# Patient Record
Sex: MALE | Race: BLACK OR AFRICAN AMERICAN | NOT HISPANIC OR LATINO | Employment: FULL TIME | URBAN - METROPOLITAN AREA
[De-identification: names, ages, dates, MRNs, and addresses within clinical notes are randomized per-mention and may not be internally consistent; named-entity substitution may affect disease eponyms.]

---

## 2017-05-31 ENCOUNTER — HOSPITAL ENCOUNTER (EMERGENCY)
Facility: HOSPITAL | Age: 42
Discharge: HOME | End: 2017-05-31
Attending: EMERGENCY MEDICINE

## 2017-05-31 VITALS
OXYGEN SATURATION: 98 % | WEIGHT: 230 LBS | HEIGHT: 68 IN | RESPIRATION RATE: 18 BRPM | BODY MASS INDEX: 34.86 KG/M2 | SYSTOLIC BLOOD PRESSURE: 131 MMHG | DIASTOLIC BLOOD PRESSURE: 82 MMHG | HEART RATE: 80 BPM | TEMPERATURE: 99 F

## 2017-05-31 DIAGNOSIS — G89.29 ACUTE EXACERBATION OF CHRONIC LOW BACK PAIN: Primary | ICD-10-CM

## 2017-05-31 DIAGNOSIS — M54.50 ACUTE EXACERBATION OF CHRONIC LOW BACK PAIN: Primary | ICD-10-CM

## 2017-05-31 PROCEDURE — 63600175 PHARM REV CODE 636 W HCPCS: Performed by: EMERGENCY MEDICINE

## 2017-05-31 PROCEDURE — 99283 EMERGENCY DEPT VISIT LOW MDM: CPT | Mod: 25

## 2017-05-31 PROCEDURE — 99284 EMERGENCY DEPT VISIT MOD MDM: CPT | Mod: ,,, | Performed by: EMERGENCY MEDICINE

## 2017-05-31 PROCEDURE — 96372 THER/PROPH/DIAG INJ SC/IM: CPT

## 2017-05-31 RX ORDER — NAPROXEN 500 MG/1
500 TABLET ORAL 2 TIMES DAILY PRN
Qty: 20 TABLET | Refills: 0 | Status: SHIPPED | OUTPATIENT
Start: 2017-05-31 | End: 2017-06-10

## 2017-05-31 RX ORDER — KETOROLAC TROMETHAMINE 30 MG/ML
10 INJECTION, SOLUTION INTRAMUSCULAR; INTRAVENOUS
Status: COMPLETED | OUTPATIENT
Start: 2017-05-31 | End: 2017-05-31

## 2017-05-31 RX ORDER — CYCLOBENZAPRINE HCL 10 MG
10 TABLET ORAL 3 TIMES DAILY PRN
Qty: 15 TABLET | Refills: 0 | Status: SHIPPED | OUTPATIENT
Start: 2017-05-31 | End: 2017-06-05

## 2017-05-31 RX ADMIN — KETOROLAC TROMETHAMINE 10 MG: 30 INJECTION, SOLUTION INTRAMUSCULAR at 05:05
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
## 2017-05-31 NOTE — ED NOTES
Patient identifiers verified and correct for Andrea Shields.    LOC: The patient is awake, alert and aware of environment with an appropriate affect, the patient is oriented x 3 and speaking appropriately.  APPEARANCE: Patient resting comfortably and in no acute distress, patient is clean and well groomed, patient's clothing is properly fastened.  SKIN: The skin is warm and dry, color consistent with ethnicity, patient has normal skin turgor and moist mucus membranes, skin intact, no breakdown or bruising noted.  MUSCULOSKELETAL: Patient moving all extremities spontaneously, no obvious swelling or deformities noted.

## 2017-05-31 NOTE — ED PROVIDER NOTES
Encounter Date: 5/31/2017    SCRIBE #1 NOTE: I, Radha Torres, am scribing for, and in the presence of,  Dr. Hutchinson. I have scribed the entire note.       History     Chief Complaint   Patient presents with    Abdominal Pain     cramping up on L side and in my back,      Review of patient's allergies indicates:  No Known Allergies  Time patient was seen by the provider: 5:22 PM      The patient is a 41 y.o. male with hx of: HTN, CAD and DM that presents to the ED with a complaint of lower back pain that radiates to his abdomen. Pt states this is a chronic problem that he has had intermittenlty for years. He says that when he lifts his legs the pain becomes more severe. He reports that aleve and motrin do not relieve his symptoms. Pt denies bladder and fecal incontinence and numbness/weakness.            The history is provided by the patient and medical records.     History reviewed. No pertinent past medical history.  Past Surgical History:   Procedure Laterality Date    FRACTURE SURGERY       Family History   Problem Relation Age of Onset    Hypertension Mother     Lupus Mother     Thyroid disease Maternal Aunt      Social History   Substance Use Topics    Smoking status: Never Smoker    Smokeless tobacco: Not on file    Alcohol use Yes      Comment: occasionally     Review of Systems   Constitutional: Negative for chills and fever.   HENT: Negative for nosebleeds.    Eyes: Negative for redness.   Respiratory: Negative for shortness of breath.    Cardiovascular: Negative for chest pain.   Gastrointestinal: Positive for abdominal pain. Negative for nausea and vomiting.   Genitourinary: Negative for difficulty urinating.   Musculoskeletal: Positive for back pain.   Neurological: Negative for weakness and numbness.       Physical Exam     Initial Vitals [05/31/17 1438]   BP Pulse Resp Temp SpO2   131/82 80 18 98.8 °F (37.1 °C) 98 %     Physical Exam    Nursing note and vitals reviewed.  Constitutional: He  appears well-developed and well-nourished. He is not diaphoretic. No distress.   Musculoskeletal: He exhibits no edema.   Pt has positive leg raise in bilateral legs to 60 degrees. No TTP on spine or paraspinal muscles.   Neurological: He is alert and oriented to person, place, and time.   Pt has nml strenght and sensation in bilateral lower extremities. Has nml gait.    Skin: Skin is warm. No rash and no abscess noted. No erythema.   Pt has dry scaly skin over both feet with some hyperpigmentation and discoloration that seems old.         ED Course   Procedures  Labs Reviewed - No data to display          Medical Decision Making:   History:   Old Medical Records: I decided to obtain old medical records.  Old Records Summarized: other records.       <> Summary of Records: No past medical history. Previously seen in ED for migraine and n/v.  Initial Assessment:   Pt with chronic back pain with acute exacerbation in last 3 days. He is neurologically  intact with no signs of symptoms of cauda equina. Will treat with NSAID's and muscle relaxant and outpatient PCP follow-up. Skin and feet appear to be dry and scaly and not necessarily from fungal infection advised to follow-up with PCP and return instructions given.            Scribe Attestation:   Scribe #1: I performed the above scribed service and the documentation accurately describes the services I performed. I attest to the accuracy of the note.    Attending Attestation:           Physician Attestation for Scribe:  Physician Attestation Statement for Scribe #1: I, Dr. Hutchinson, reviewed documentation, as scribed by Radha Torres in my presence, and it is both accurate and complete.                 ED Course     Clinical Impression:   The encounter diagnosis was Acute exacerbation of chronic low back pain.    Disposition:   Disposition: Discharged  Condition: Stable       Shalonda Hutchinson MD  06/10/17 1945

## 2018-05-06 ENCOUNTER — OFFICE VISIT (OUTPATIENT)
Dept: URGENT CARE | Facility: CLINIC | Age: 43
End: 2018-05-06
Payer: MEDICAID

## 2018-05-06 VITALS
HEIGHT: 68 IN | TEMPERATURE: 97 F | BODY MASS INDEX: 34.86 KG/M2 | OXYGEN SATURATION: 98 % | HEART RATE: 83 BPM | WEIGHT: 230 LBS

## 2018-05-06 DIAGNOSIS — M79.672 FOOT PAIN, LEFT: ICD-10-CM

## 2018-05-06 DIAGNOSIS — R09.89 CHEST CONGESTION: ICD-10-CM

## 2018-05-06 DIAGNOSIS — R06.02 SOB (SHORTNESS OF BREATH): Primary | ICD-10-CM

## 2018-05-06 PROCEDURE — 99203 OFFICE O/P NEW LOW 30 MIN: CPT | Mod: S$GLB,,, | Performed by: NURSE PRACTITIONER

## 2018-05-06 RX ORDER — DEXAMETHASONE SODIUM PHOSPHATE 100 MG/10ML
8 INJECTION INTRAMUSCULAR; INTRAVENOUS
Status: COMPLETED | OUTPATIENT
Start: 2018-05-06 | End: 2018-05-06

## 2018-05-06 RX ORDER — GUAIFENESIN 100 MG/5ML
200 SOLUTION ORAL 3 TIMES DAILY PRN
Refills: 0 | COMMUNITY
Start: 2018-05-06 | End: 2018-05-07 | Stop reason: SDUPTHER

## 2018-05-06 RX ADMIN — DEXAMETHASONE SODIUM PHOSPHATE 8 MG: 100 INJECTION INTRAMUSCULAR; INTRAVENOUS at 09:05

## 2018-05-06 NOTE — PATIENT INSTRUCTIONS
Please drink plenty of fluids.  Please get plenty of rest.  Please return here or go to the Emergency Department for any concerns or worsening of condition.  If you were prescribed antibiotics, please take them to completion.  If you were given wait & see antibiotics, please wait 5-7 days before taking them, and only take them if your symptoms have worsened or not improved.  If you do begin taking the antibiotics, please take them to completion.  If you were prescribed a narcotic medication, do not drive or operate heavy equipment or machinery while taking these medications.  If you were given a steroid shot in the clinic and have also been given a prescription for a steroid such as Prednisone or a Medrol Dose Pack, please begin taking them tomorrow.  If you do not have Hypertension or any history of palpitations, it is ok to take over the counter Sudafed or Mucinex D or Allegra-D or Claritin-D or Zyrtec-D.  If you do take one of the above, it is ok to combine that with plain over the counter Mucinex or Allegra or Claritin or Zyrtec.  If for example you are taking Zyrtec -D, you can combine that with Mucinex, but not Mucinex-D.  If you are taking Mucinex-D, you can combine that with plain Allegra or Claritin or Zyrtec.   If you do have Hypertension or palpitations, it is safe to take Coricidin HBP for relief of sinus symptoms.  If not allergic, please take over the counter Tylenol (Acetaminophen) and/or Motrin (Ibuprofen) as directed for control of pain and/or fever.  Please follow up with your primary care doctor or specialist as needed.    If you  smoke, please stop smoking.  Viral Upper Respiratory Illness (Adult)  You have a viral upper respiratory illness (URI), which is another term for the common cold. This illness is contagious during the first few days. It is spread through the air by coughing and sneezing. It may also be spread by direct contact (touching the sick person and then touching your own eyes,  nose, or mouth). Frequent handwashing will decrease risk of spread. Most viral illnesses go away within 7 to 10 days with rest and simple home remedies. Sometimes the illness may last for several weeks. Antibiotics will not kill a virus, and they are generally not prescribed for this condition.    Home care  · If symptoms are severe, rest at home for the first 2 to 3 days. When you resume activity, don't let yourself get too tired.  · Avoid being exposed to cigarette smoke (yours or others).  · You may use acetaminophen or ibuprofen to control pain and fever, unless another medicine was prescribed. (Note: If you have chronic liver or kidney disease, have ever had a stomach ulcer or gastrointestinal bleeding, or are taking blood-thinning medicines, talk with your healthcare provider before using these medicines.) Aspirin should never be given to anyone under 18 years of age who is ill with a viral infection or fever. It may cause severe liver or brain damage.  · Your appetite may be poor, so a light diet is fine. Avoid dehydration by drinking 6 to 8 glasses of fluids per day (water, soft drinks, juices, tea, or soup). Extra fluids will help loosen secretions in the nose and lungs.  · Over-the-counter cold medicines will not shorten the length of time youre sick, but they may be helpful for the following symptoms: cough, sore throat, and nasal and sinus congestion. (Note: Do not use decongestants if you have high blood pressure.)  Follow-up care  Follow up with your healthcare provider, or as advised.  When to seek medical advice  Call your healthcare provider right away if any of these occur:  · Cough with lots of colored sputum (mucus)  · Severe headache; face, neck, or ear pain  · Difficulty swallowing due to throat pain  · Fever of 100.4°F (38°C)  Call 911, or get immediate medical care  Call emergency services right away if any of these occur:  · Chest pain, shortness of breath, wheezing, or difficulty  breathing  · Coughing up blood  · Inability to swallow due to throat pain  Date Last Reviewed: 9/13/2015  © 2334-5412 [employer]. [address] [place] [address], [place] [address]. All rights reserved. This information is not intended as a substitute for professional medical care. Always follow your healthcare professional's instructions.    Please drink plenty of fluids.  Please get plenty of rest.  Please return here or go to the Emergency Department for any concerns or worsening of condition.  If you were given a steroid shot in the clinic and have also been given a prescription for a steroid such as Prednisone or a Medrol Dose Pack, please begin taking them tomorrow.  If you do not have Hypertension or any history of palpitations, it is ok to take over the counter Sudafed or Mucinex D or Allegra-D or Claritin-D or Zyrtec-D.  If you do take one of the above, it is ok to combine that with plain over the counter Mucinex or Allegra or Claritin or Zyrtec.  If for example you are taking Zyrtec -D, you can combine that with Mucinex, but not Mucinex-D.  If you are taking Mucinex-D, you can combine that with plain Allegra or Claritin or Zyrtec.   If you do have Hypertension or palpitations, it is safe to take Coricidin HBP for relief of sinus symptoms.  If not allergic, please take over the counter Tylenol (Acetaminophen) and/or Motrin (Ibuprofen) as directed for control of pain and/or fever.  Please follow up with your primary care doctor or specialist as needed.    If you  smoke, please stop smoking.

## 2018-05-06 NOTE — PROGRESS NOTES
"Subjective:       Patient ID: Andrea Shields is a 42 y.o. male.    Vitals:  height is 5' 8" (1.727 m) and weight is 104.3 kg (230 lb). His temperature is 97.1 °F (36.2 °C). His pulse is 83. His oxygen saturation is 98%.     Chief Complaint: Asthma and Foot Pain    Asthma   He complains of chest tightness, cough, frequent throat clearing, shortness of breath, sputum production and wheezing. This is a recurrent problem. The current episode started 1 to 4 weeks ago. Asthma causes daytime symptoms frequently. Asthma causes nighttime symptoms frequently. The problem has been unchanged. The cough is hoarse, productive of sputum, hacking, barking and croupy. Pertinent negatives include no chest pain, fever, headaches or sore throat. His symptoms are aggravated by pollen and exposure to smoke. He reports no improvement on treatment. His symptoms are not alleviated by steroid inhaler. Risk factors for lung disease include smoking/tobacco exposure. His past medical history is significant for asthma.   Foot Pain   This is a new problem. The current episode started 1 to 4 weeks ago. The problem occurs constantly. The problem has been gradually worsening. Associated symptoms include coughing. Pertinent negatives include no abdominal pain, chest pain, chills, fever, headaches, nausea, rash, sore throat or vomiting. The symptoms are aggravated by standing and walking. He has tried nothing for the symptoms.     Review of Systems   Constitution: Positive for night sweats. Negative for chills and fever.   HENT: Negative for sore throat.    Eyes: Negative for blurred vision.   Cardiovascular: Negative for chest pain.   Respiratory: Positive for cough, shortness of breath, sputum production and wheezing.    Skin: Negative for rash.   Musculoskeletal: Positive for joint pain. Negative for back pain.   Gastrointestinal: Negative for abdominal pain, diarrhea, nausea and vomiting.   Neurological: Negative for headaches. "   Psychiatric/Behavioral: The patient is not nervous/anxious.    All other systems reviewed and are negative.      Objective:      Physical Exam   Constitutional: He is oriented to person, place, and time. He appears well-developed and well-nourished. He is cooperative.  Non-toxic appearance. He does not appear ill. No distress.   HENT:   Head: Normocephalic and atraumatic.   Right Ear: Hearing, tympanic membrane, external ear and ear canal normal.   Left Ear: Hearing, tympanic membrane, external ear and ear canal normal.   Nose: Nose normal. No mucosal edema, rhinorrhea or nasal deformity. No epistaxis. Right sinus exhibits no maxillary sinus tenderness and no frontal sinus tenderness. Left sinus exhibits no maxillary sinus tenderness and no frontal sinus tenderness.   Mouth/Throat: Uvula is midline, oropharynx is clear and moist and mucous membranes are normal. No trismus in the jaw. Normal dentition. No uvula swelling. No posterior oropharyngeal erythema.   Eyes: Conjunctivae and lids are normal. No scleral icterus.   Sclera clear bilat   Neck: Trachea normal, full passive range of motion without pain and phonation normal. Neck supple.   Cardiovascular: Normal rate, regular rhythm, normal heart sounds, intact distal pulses and normal pulses.    Pulmonary/Chest: Effort normal and breath sounds normal. No respiratory distress. He has no decreased breath sounds. He has no wheezes. He has no rhonchi. He has no rales.   Abdominal: Soft. Normal appearance and bowel sounds are normal. He exhibits no distension. There is no tenderness.   Musculoskeletal: Normal range of motion. He exhibits no edema or deformity.        Left foot: There is bony tenderness.        Feet:    Neurological: He is alert and oriented to person, place, and time. He exhibits normal muscle tone. Coordination normal.   Skin: Skin is warm, dry and intact. He is not diaphoretic. No pallor.   Psychiatric: He has a normal mood and affect. His speech is  normal and behavior is normal. Judgment and thought content normal. Cognition and memory are normal.   Nursing note and vitals reviewed.      Assessment:       1. SOB (shortness of breath)    2. Chest congestion    3. Foot pain, left        Plan:         SOB (shortness of breath)  -     X-Ray Chest PA And Lateral; Future; Expected date: 05/06/2018    Chest congestion    Foot pain, left  -     Ambulatory referral to Orthopedics    Other orders  -     dexamethasone injection 8 mg; Inject 0.8 mLs (8 mg total) into the muscle one time.  -     guaifenesin 100 mg/5 ml (DIABETIC TUSSIN EX) 100 mg/5 mL syrup; Take 10 mLs (200 mg total) by mouth 3 (three) times daily as needed for Cough.; Refill: 0      Please drink plenty of fluids.  Please get plenty of rest.  Please return here or go to the Emergency Department for any concerns or worsening of condition.  If you were prescribed antibiotics, please take them to completion.  If you were given wait & see antibiotics, please wait 5-7 days before taking them, and only take them if your symptoms have worsened or not improved.  If you do begin taking the antibiotics, please take them to completion.  If you were prescribed a narcotic medication, do not drive or operate heavy equipment or machinery while taking these medications.  If you were given a steroid shot in the clinic and have also been given a prescription for a steroid such as Prednisone or a Medrol Dose Pack, please begin taking them tomorrow.  If you do not have Hypertension or any history of palpitations, it is ok to take over the counter Sudafed or Mucinex D or Allegra-D or Claritin-D or Zyrtec-D.  If you do take one of the above, it is ok to combine that with plain over the counter Mucinex or Allegra or Claritin or Zyrtec.  If for example you are taking Zyrtec -D, you can combine that with Mucinex, but not Mucinex-D.  If you are taking Mucinex-D, you can combine that with plain Allegra or Claritin or Zyrtec.   If  you do have Hypertension or palpitations, it is safe to take Coricidin HBP for relief of sinus symptoms.  If not allergic, please take over the counter Tylenol (Acetaminophen) and/or Motrin (Ibuprofen) as directed for control of pain and/or fever.  Please follow up with your primary care doctor or specialist as needed.    If you  smoke, please stop smoking.  Viral Upper Respiratory Illness (Adult)  You have a viral upper respiratory illness (URI), which is another term for the common cold. This illness is contagious during the first few days. It is spread through the air by coughing and sneezing. It may also be spread by direct contact (touching the sick person and then touching your own eyes, nose, or mouth). Frequent handwashing will decrease risk of spread. Most viral illnesses go away within 7 to 10 days with rest and simple home remedies. Sometimes the illness may last for several weeks. Antibiotics will not kill a virus, and they are generally not prescribed for this condition.    Home care  · If symptoms are severe, rest at home for the first 2 to 3 days. When you resume activity, don't let yourself get too tired.  · Avoid being exposed to cigarette smoke (yours or others).  · You may use acetaminophen or ibuprofen to control pain and fever, unless another medicine was prescribed. (Note: If you have chronic liver or kidney disease, have ever had a stomach ulcer or gastrointestinal bleeding, or are taking blood-thinning medicines, talk with your healthcare provider before using these medicines.) Aspirin should never be given to anyone under 18 years of age who is ill with a viral infection or fever. It may cause severe liver or brain damage.  · Your appetite may be poor, so a light diet is fine. Avoid dehydration by drinking 6 to 8 glasses of fluids per day (water, soft drinks, juices, tea, or soup). Extra fluids will help loosen secretions in the nose and lungs.  · Over-the-counter cold medicines will not  shorten the length of time youre sick, but they may be helpful for the following symptoms: cough, sore throat, and nasal and sinus congestion. (Note: Do not use decongestants if you have high blood pressure.)  Follow-up care  Follow up with your healthcare provider, or as advised.  When to seek medical advice  Call your healthcare provider right away if any of these occur:  · Cough with lots of colored sputum (mucus)  · Severe headache; face, neck, or ear pain  · Difficulty swallowing due to throat pain  · Fever of 100.4°F (38°C)  Call 911, or get immediate medical care  Call emergency services right away if any of these occur:  · Chest pain, shortness of breath, wheezing, or difficulty breathing  · Coughing up blood  · Inability to swallow due to throat pain  Date Last Reviewed: 9/13/2015 © 2000-2017 [employer]. [address] [place] [address]. All rights reserved. This information is not intended as a substitute for professional medical care. Always follow your healthcare professional's instructions.    Please drink plenty of fluids.  Please get plenty of rest.  Please return here or go to the Emergency Department for any concerns or worsening of condition.  If you were given a steroid shot in the clinic and have also been given a prescription for a steroid such as Prednisone or a Medrol Dose Pack, please begin taking them tomorrow.  If you do not have Hypertension or any history of palpitations, it is ok to take over the counter Sudafed or Mucinex D or Allegra-D or Claritin-D or Zyrtec-D.  If you do take one of the above, it is ok to combine that with plain over the counter Mucinex or Allegra or Claritin or Zyrtec.  If for example you are taking Zyrtec -D, you can combine that with Mucinex, but not Mucinex-D.  If you are taking Mucinex-D, you can combine that with plain Allegra or Claritin or Zyrtec.   If you do have Hypertension or palpitations, it is safe to take Coricidin HBP for  relief of sinus symptoms.  If not allergic, please take over the counter Tylenol (Acetaminophen) and/or Motrin (Ibuprofen) as directed for control of pain and/or fever.  Please follow up with your primary care doctor or specialist as needed.    If you  smoke, please stop smoking.

## 2018-05-07 RX ORDER — ALBUTEROL SULFATE 90 UG/1
1-2 AEROSOL, METERED RESPIRATORY (INHALATION) EVERY 6 HOURS PRN
Qty: 1 INHALER | Refills: 0 | Status: SHIPPED | OUTPATIENT
Start: 2018-05-07 | End: 2021-03-30 | Stop reason: SDUPTHER

## 2018-05-07 RX ORDER — GUAIFENESIN 100 MG/5ML
200 SOLUTION ORAL 3 TIMES DAILY PRN
Qty: 120 ML | Refills: 0 | Status: SHIPPED | OUTPATIENT
Start: 2018-05-07 | End: 2018-05-17

## 2018-09-02 ENCOUNTER — HOSPITAL ENCOUNTER (EMERGENCY)
Facility: HOSPITAL | Age: 43
Discharge: HOME | End: 2018-09-02
Attending: EMERGENCY MEDICINE
Payer: MEDICAID

## 2018-09-02 VITALS
OXYGEN SATURATION: 98 % | BODY MASS INDEX: 34.86 KG/M2 | DIASTOLIC BLOOD PRESSURE: 73 MMHG | SYSTOLIC BLOOD PRESSURE: 132 MMHG | RESPIRATION RATE: 16 BRPM | WEIGHT: 230 LBS | HEART RATE: 77 BPM | HEIGHT: 68 IN | TEMPERATURE: 98 F

## 2018-09-02 DIAGNOSIS — R51.9 NONINTRACTABLE EPISODIC HEADACHE, UNSPECIFIED HEADACHE TYPE: Primary | ICD-10-CM

## 2018-09-02 PROCEDURE — 99283 EMERGENCY DEPT VISIT LOW MDM: CPT

## 2018-09-02 PROCEDURE — 99283 EMERGENCY DEPT VISIT LOW MDM: CPT | Mod: ,,, | Performed by: EMERGENCY MEDICINE

## 2018-09-02 RX ORDER — BUTALBITAL, ACETAMINOPHEN AND CAFFEINE 50; 325; 40 MG/1; MG/1; MG/1
1 TABLET ORAL EVERY 4 HOURS PRN
Qty: 30 TABLET | Refills: 1 | Status: SHIPPED | OUTPATIENT
Start: 2018-09-02 | End: 2018-10-02

## 2018-09-02 NOTE — ED NOTES
Chief Complaint   Patient presents with    Headache     Intermittent over the past 2 weeks     Pt presents to ED with c/o HA x 2 weeks. Pt states he took aspirin and tylenol PTA without relief. HA is left sided. Pt denies visual disturbances. Pt has no prior medical HX reported. Pt denies NV, CP, SOB.    Active Ambulatory Problems     Diagnosis Date Noted    No Active Ambulatory Problems     Resolved Ambulatory Problems     Diagnosis Date Noted    No Resolved Ambulatory Problems     Past Medical History:   Diagnosis Date    Asthma      Review of patient's allergies indicates:  No Known Allergies    Adult physical assessment:    Neuro: Patient AAOx4.   HEENT: PERRLA, no purulent drainage to nares, throat, eyes. Negative for visual disturbances.  Cardiac: WNL. No C/O CP at this time.  Respiratory: WNL. Airway is open and patent. No C/O SOB at this time.  Musculoskeletal: Patient moving all extremities spontaneously. No obvious swelling or deformity visible.

## 2018-09-02 NOTE — ED PROVIDER NOTES
"Encounter Date: 9/2/2018       History     Chief Complaint   Patient presents with    Headache     Intermittent over the past 2 weeks     HPI     41 yo male presents for eval of 2 weeks of intermittent HA.  Pain is sharp, located on the left frontal area with extension to the temporal and parietal areas.  Episodes last 20-40 min at a time and can be relieved with OTC meds sometimes.  Pt states that the only thing he has noticed is that when he drinks beer he always gets the HA although he gets them sometimes without drinking.  States that assoc with the HA is occasionally blurry vision and a "fullness feeling" in the frontal area.      Pt had similar episodes 2 years ago.  At that time CTA of the head demonstrated no SAH and normal vasculature.      Review of patient's allergies indicates:  No Known Allergies  Past Medical History:   Diagnosis Date    Asthma      Past Surgical History:   Procedure Laterality Date    FRACTURE SURGERY       Family History   Problem Relation Age of Onset    Hypertension Mother     Lupus Mother     Thyroid disease Maternal Aunt      Social History     Tobacco Use    Smoking status: Current Every Day Smoker    Smokeless tobacco: Never Used   Substance Use Topics    Alcohol use: Yes     Comment: occasionally    Drug use: No     Review of Systems   Constitutional: Negative for chills and fever.   HENT: Negative for ear pain, facial swelling, sinus pressure, sinus pain, sore throat and voice change.    Eyes: Positive for visual disturbance. Negative for photophobia, pain, discharge and redness.   Respiratory: Negative for cough and shortness of breath.    Cardiovascular: Negative for chest pain and palpitations.   Gastrointestinal: Negative for nausea and vomiting.   Genitourinary: Negative for dysuria and flank pain.   Musculoskeletal: Negative for back pain and neck pain.   Skin: Negative for rash.   Neurological: Positive for headaches. Negative for dizziness, seizures, " syncope, facial asymmetry, speech difficulty, weakness, light-headedness and numbness.       Physical Exam     Initial Vitals [09/02/18 0022]   BP Pulse Resp Temp SpO2   132/73 77 16 97.9 °F (36.6 °C) 98 %      MAP       --         Physical Exam    Nursing note and vitals reviewed.  Constitutional: He is not diaphoretic. No distress.   HENT:   Head: Normocephalic and atraumatic.   Right Ear: Tympanic membrane normal.   Left Ear: Tympanic membrane normal.   Eyes: Conjunctivae and EOM are normal. Pupils are equal, round, and reactive to light. No scleral icterus.   Neck: Normal range of motion. Neck supple.   Cardiovascular: Normal rate, regular rhythm and normal heart sounds.   No murmur heard.  Pulmonary/Chest: Breath sounds normal. No respiratory distress.   Abdominal: Soft. There is no tenderness.   Musculoskeletal: Normal range of motion.   Neurological: He is alert and oriented to person, place, and time. No cranial nerve deficit or sensory deficit.   DA SILVA symmetrically   Skin: Skin is warm and dry.         ED Course   Procedures  Labs Reviewed - No data to display       Imaging Results    None                APC / Resident Notes:   MDM     HOII MDM    DDx includes but is not limited to:tension vs cluster vs migraine HA, SAH, intracranial mass  A/P:  Pt is a 43 yo male with HA.  Pt has no focal deficit associated with HA and intermittent nature as well as neg CT  indicates that he does not have intracranial bleeding or mass.  Currently pt is asymptomatic.  Will discharge pt with fiorocet and referral to neurology as pt would likely benefit from a headache specialist Case Discussed with Dr. Eric NGUYEN  09/02/2018 12:56 AM                      Clinical Impression:   The encounter diagnosis was Nonintractable episodic headache, unspecified headache type.                             Madyson Loza MD  Resident  09/02/18 0102

## 2018-09-02 NOTE — DISCHARGE INSTRUCTIONS
When to seek medical advice  Call your healthcare provider right away if any of these occur:  Your head pain suddenly gets worse after sexual intercourse or strenuous activity  Your head pain doesn?t get better within 24 hours  You aren?t able to keep liquids down (repeated vomiting)  Fever of 100.4ºF (38ºC) or higher, or as directed by your healthcare provider  Stiff neck  Extreme drowsiness, confusion, or fainting  Dizziness or dizziness with spinning sensation (vertigo)  Weakness in an arm or leg or one side of your face  You have trouble talking or seeing

## 2018-09-07 ENCOUNTER — HOSPITAL ENCOUNTER (EMERGENCY)
Facility: HOSPITAL | Age: 43
Discharge: HOME | End: 2018-09-07
Attending: EMERGENCY MEDICINE
Payer: MEDICAID

## 2018-09-07 VITALS
TEMPERATURE: 99 F | WEIGHT: 230 LBS | HEIGHT: 68 IN | OXYGEN SATURATION: 100 % | HEART RATE: 82 BPM | BODY MASS INDEX: 34.86 KG/M2 | DIASTOLIC BLOOD PRESSURE: 86 MMHG | SYSTOLIC BLOOD PRESSURE: 138 MMHG | RESPIRATION RATE: 17 BRPM

## 2018-09-07 DIAGNOSIS — R51.9 NONINTRACTABLE HEADACHE, UNSPECIFIED CHRONICITY PATTERN, UNSPECIFIED HEADACHE TYPE: Primary | ICD-10-CM

## 2018-09-07 PROCEDURE — 99284 EMERGENCY DEPT VISIT MOD MDM: CPT | Mod: ,,, | Performed by: PHYSICIAN ASSISTANT

## 2018-09-07 PROCEDURE — 25000003 PHARM REV CODE 250: Performed by: PHYSICIAN ASSISTANT

## 2018-09-07 PROCEDURE — 96365 THER/PROPH/DIAG IV INF INIT: CPT

## 2018-09-07 PROCEDURE — 63600175 PHARM REV CODE 636 W HCPCS: Performed by: PHYSICIAN ASSISTANT

## 2018-09-07 PROCEDURE — 99284 EMERGENCY DEPT VISIT MOD MDM: CPT | Mod: 25

## 2018-09-07 PROCEDURE — 96375 TX/PRO/DX INJ NEW DRUG ADDON: CPT

## 2018-09-07 RX ORDER — PROCHLORPERAZINE EDISYLATE 5 MG/ML
5 INJECTION INTRAMUSCULAR; INTRAVENOUS ONCE
Status: COMPLETED | OUTPATIENT
Start: 2018-09-07 | End: 2018-09-07

## 2018-09-07 RX ORDER — DIPHENHYDRAMINE HYDROCHLORIDE 50 MG/ML
12.5 INJECTION INTRAMUSCULAR; INTRAVENOUS
Status: COMPLETED | OUTPATIENT
Start: 2018-09-07 | End: 2018-09-07

## 2018-09-07 RX ORDER — ACETAMINOPHEN 10 MG/ML
1000 INJECTION, SOLUTION INTRAVENOUS ONCE
Status: COMPLETED | OUTPATIENT
Start: 2018-09-07 | End: 2018-09-07

## 2018-09-07 RX ADMIN — DIPHENHYDRAMINE HYDROCHLORIDE 12.5 MG: 50 INJECTION, SOLUTION INTRAMUSCULAR; INTRAVENOUS at 10:09

## 2018-09-07 RX ADMIN — SODIUM CHLORIDE 1000 ML: 0.9 INJECTION, SOLUTION INTRAVENOUS at 10:09

## 2018-09-07 RX ADMIN — PROCHLORPERAZINE EDISYLATE 5 MG: 5 INJECTION INTRAMUSCULAR; INTRAVENOUS at 10:09

## 2018-09-07 RX ADMIN — ACETAMINOPHEN 1000 MG: 10 INJECTION, SOLUTION INTRAVENOUS at 10:09

## 2018-09-08 NOTE — DISCHARGE INSTRUCTIONS
Continue Fioricet at home. Take ibuprofen if needed. Follow up with family doctor on Monday as scheduled for possible referral to neurology.

## 2018-09-08 NOTE — ED PROVIDER NOTES
Encounter Date: 9/7/2018       History     Chief Complaint   Patient presents with    Headache     Pt reports headache that began around 1845. Denies N/V. Pt states he was seen 2 days ago for same incident.     Patient is a 42-year-old male with history of asthma presenting to the ER for evaluation of headache. The headache is located in the left parietal region.  Started at around 6 o'clock this evening.  Patient has had history of headaches in the past.  He states that he does have some photophobia at this time.  States he has had some blurred vision off and on, no blurred vision at this time.  Denies any paresthesia or focal weakness. No head injury or trauma. He denies any cough congestion URI symptoms.  No fever chills. Denies any nuchal rigidity or neck stiffness. He has taken Fioricet with no alleviation of his symptoms. Patient was seen in the ED 5 days ago for similar complaints. Does not follow with a neurologist at this time.      The history is provided by the patient.     Review of patient's allergies indicates:  No Known Allergies  Past Medical History:   Diagnosis Date    Asthma      Past Surgical History:   Procedure Laterality Date    FRACTURE SURGERY       Family History   Problem Relation Age of Onset    Hypertension Mother     Lupus Mother     Thyroid disease Maternal Aunt      Social History     Tobacco Use    Smoking status: Current Every Day Smoker    Smokeless tobacco: Never Used   Substance Use Topics    Alcohol use: Yes     Comment: occasionally    Drug use: No     Review of Systems   Constitutional: Negative for chills and fever.   HENT: Negative for congestion.    Eyes: Positive for photophobia and visual disturbance. Negative for pain, discharge, redness and itching.   Respiratory: Negative for cough and shortness of breath.    Cardiovascular: Negative for chest pain and palpitations.   Gastrointestinal: Negative for nausea and vomiting.   Musculoskeletal: Negative for  myalgias.   Skin: Negative for rash.   Allergic/Immunologic: Negative for immunocompromised state.   Neurological: Positive for headaches. Negative for dizziness, weakness and light-headedness.   Hematological: Does not bruise/bleed easily.   Psychiatric/Behavioral: Negative for confusion.       Physical Exam     Initial Vitals [09/07/18 2141]   BP Pulse Resp Temp SpO2   138/86 82 17 99 °F (37.2 °C) 100 %      MAP       --         Physical Exam    Constitutional: He appears well-developed and well-nourished. He is not diaphoretic. No distress.   HENT:   Head: Normocephalic and atraumatic.   Mouth/Throat: Oropharynx is clear and moist.   Eyes: Conjunctivae and EOM are normal. Pupils are equal, round, and reactive to light.   Neck: Normal range of motion and full passive range of motion without pain. Neck supple. No spinous process tenderness and no muscular tenderness present. No neck rigidity.   Cardiovascular: Normal rate, regular rhythm, normal heart sounds and intact distal pulses.   Pulmonary/Chest: Breath sounds normal. No respiratory distress.   Neurological: He is alert and oriented to person, place, and time. He has normal strength. No sensory deficit. Coordination normal.   FNF normal  HKS  Normal    Skin: Skin is warm and dry.         ED Course   Procedures  Labs Reviewed - No data to display       Imaging Results    None                APC / Resident Notes:   Patient seen in the ER promptly upon arrival.  He is afebrile, no acute distress. No neurological deficits noted on my examination. IV access, fluids given.  Patient was given IV Tylenol, Compazine and Benadryl.    Upon reassessment patient states that he has had significant alleviation of his headache.  It is now a 3/10 as compared to 10/10 when he initially was seen.  Do not feel that patient requires imaging at this time.  He does have an appointment with family doctor on Monday for possible referral to Neurology.  I did advise him to keep his  previously scheduled appointment.  He does have Fioricet to take at home.  I did advise him to take ibuprofen if his pain was not controlled with Fioricet.  He is otherwise stable for discharge and outpatient follow-up.    The care of this patient was overseen by attending physician who agrees with treatment, plan, and disposition.                     Clinical Impression:   The encounter diagnosis was Nonintractable headache, unspecified chronicity pattern, unspecified headache type.      Disposition:   Disposition: Discharged  Condition: Stable                        Cortney Oseguera PA-C  09/08/18 0003

## 2020-01-14 ENCOUNTER — OFFICE VISIT (OUTPATIENT)
Dept: URGENT CARE | Facility: CLINIC | Age: 45
End: 2020-01-14
Payer: MEDICAID

## 2020-01-14 VITALS
OXYGEN SATURATION: 95 % | WEIGHT: 196 LBS | RESPIRATION RATE: 18 BRPM | TEMPERATURE: 99 F | DIASTOLIC BLOOD PRESSURE: 81 MMHG | SYSTOLIC BLOOD PRESSURE: 127 MMHG | BODY MASS INDEX: 29.7 KG/M2 | HEART RATE: 88 BPM | HEIGHT: 68 IN

## 2020-01-14 DIAGNOSIS — R05.9 COUGH: ICD-10-CM

## 2020-01-14 DIAGNOSIS — J06.9 UPPER RESPIRATORY TRACT INFECTION, UNSPECIFIED TYPE: Primary | ICD-10-CM

## 2020-01-14 PROCEDURE — 71046 XR CHEST PA AND LATERAL: ICD-10-PCS | Mod: S$GLB,,, | Performed by: RADIOLOGY

## 2020-01-14 PROCEDURE — 99213 PR OFFICE/OUTPT VISIT, EST, LEVL III, 20-29 MIN: ICD-10-PCS | Mod: S$GLB,,, | Performed by: FAMILY MEDICINE

## 2020-01-14 PROCEDURE — 99213 OFFICE O/P EST LOW 20 MIN: CPT | Mod: S$GLB,,, | Performed by: FAMILY MEDICINE

## 2020-01-14 PROCEDURE — 71046 X-RAY EXAM CHEST 2 VIEWS: CPT | Mod: S$GLB,,, | Performed by: RADIOLOGY

## 2020-01-14 NOTE — PATIENT INSTRUCTIONS
Drink hot fluids only such as hot water or tea. Do not drink anything with ice, nothing from the fridge, no ice-cream. Stop smoking. Rest if you feel it may help. Do not worry about the noise in your chest. It will clear.          Viral Upper Respiratory Illness (Adult)  You have a viral upper respiratory illness (URI), which is another term for the common cold. This illness is contagious during the first few days. It is spread through the air by coughing and sneezing. It may also be spread by direct contact (touching the sick person and then touching your own eyes, nose, or mouth). Frequent handwashing will decrease risk of spread. Most viral illnesses go away within 7 to 10 days with rest and simple home remedies. Sometimes the illness may last for several weeks. Antibiotics will not kill a virus, and they are generally not prescribed for this condition.    Home care  · If symptoms are severe, rest at home for the first 2 to 3 days. When you resume activity, don't let yourself get too tired.  · Avoid being exposed to cigarette smoke (yours or others).  · You may use acetaminophen or ibuprofen to control pain and fever, unless another medicine was prescribed. (Note: If you have chronic liver or kidney disease, have ever had a stomach ulcer or gastrointestinal bleeding, or are taking blood-thinning medicines, talk with your healthcare provider before using these medicines.) Aspirin should never be given to anyone under 18 years of age who is ill with a viral infection or fever. It may cause severe liver or brain damage.  · Your appetite may be poor, so a light diet is fine. Avoid dehydration by drinking 6 to 8 glasses of fluids per day (water, soft drinks, juices, tea, or soup). Extra fluids will help loosen secretions in the nose and lungs.  · Over-the-counter cold medicines will not shorten the length of time youre sick, but they may be helpful for the following symptoms: cough, sore throat, and nasal and sinus  congestion. (Note: Do not use decongestants if you have high blood pressure.)  Follow-up care  Follow up with your healthcare provider, or as advised.  When to seek medical advice  Call your healthcare provider right away if any of these occur:  · Cough with lots of colored sputum (mucus)  · Severe headache; face, neck, or ear pain  · Difficulty swallowing due to throat pain  · Fever of 100.4°F (38°C)  Call 911, or get immediate medical care  Call emergency services right away if any of these occur:  · Chest pain, shortness of breath, wheezing, or difficulty breathing  · Coughing up blood  · Inability to swallow due to throat painViral  ·

## 2020-01-14 NOTE — PROGRESS NOTES
"Subjective:       Patient ID: Andrea Shields is a 44 y.o. male.    Vitals:  height is 5' 8" (1.727 m) and weight is 88.9 kg (196 lb). His temperature is 99 °F (37.2 °C). His blood pressure is 127/81 and his pulse is 88. His respiration is 18 and oxygen saturation is 95%.     Chief Complaint: URI    44-year-old male with a past medical history of asthma presents to Urgent Care complaining of a rattling noise in his chest.  Patient denies any decrease in energy.  Denies any fevers chills night sweats. Patient reports seeing a non option provider last week for similar symptoms where he is prescribed a 5 day course of antibiotics.  Patient does not know the type of antibiotic.  Patient smokes cigars daily.  Patient rides his bike approximately 5 miles daily as it is only mode of transportation.    URI    This is a new problem. Episode onset: 3 weeks. The problem has been gradually worsening. The maximum temperature recorded prior to his arrival was 100.4 - 100.9 F. Associated symptoms include congestion, coughing and wheezing. Pertinent negatives include no ear pain, nausea, rash, sinus pain, sore throat or vomiting. Treatments tried: Nyquil. The treatment provided no relief.       Constitution: Positive for chills and fever. Negative for sweating and fatigue.   HENT: Positive for congestion. Negative for ear pain, sinus pain, sinus pressure, sore throat and voice change.    Neck: Negative for painful lymph nodes.   Eyes: Negative for eye redness.   Respiratory: Positive for chest tightness, cough, sputum production and wheezing. Negative for bloody sputum, COPD, shortness of breath, stridor and asthma.    Gastrointestinal: Negative for nausea and vomiting.   Musculoskeletal: Negative for muscle ache.   Skin: Negative for rash.   Allergic/Immunologic: Negative for seasonal allergies and asthma.   Hematologic/Lymphatic: Negative for swollen lymph nodes.       Objective:      Physical Exam   Constitutional: He is " oriented to person, place, and time. He appears well-developed and well-nourished. He is cooperative.  Non-toxic appearance. He does not have a sickly appearance. He does not appear ill. No distress.   HENT:   Head: Normocephalic and atraumatic.   Right Ear: Hearing, tympanic membrane, external ear and ear canal normal.   Left Ear: Hearing, tympanic membrane, external ear and ear canal normal.   Nose: Nose normal. No mucosal edema, rhinorrhea or nasal deformity. No epistaxis. Right sinus exhibits no maxillary sinus tenderness and no frontal sinus tenderness. Left sinus exhibits no maxillary sinus tenderness and no frontal sinus tenderness.   Mouth/Throat: Uvula is midline, oropharynx is clear and moist and mucous membranes are normal. No trismus in the jaw. Normal dentition. No uvula swelling. No oropharyngeal exudate, posterior oropharyngeal edema or posterior oropharyngeal erythema.   Eyes: Conjunctivae and lids are normal. No scleral icterus.   Neck: Trachea normal, full passive range of motion without pain and phonation normal. Neck supple. No neck rigidity. No edema and no erythema present.   Cardiovascular: Normal rate, regular rhythm, normal heart sounds, intact distal pulses and normal pulses.   Pulmonary/Chest: Effort normal. No accessory muscle usage. No respiratory distress. He has no decreased breath sounds. He has wheezes. He has no rhonchi.   inspiratory wheeze. No accessory muscles of respiration. Saturation at 96% on room air   Abdominal: Normal appearance.   Musculoskeletal: Normal range of motion. He exhibits no edema or deformity.   Neurological: He is alert and oriented to person, place, and time. He exhibits normal muscle tone. Coordination normal.   Skin: Skin is warm, dry, intact, not diaphoretic and not pale.   Psychiatric: He has a normal mood and affect. His speech is normal and behavior is normal. Judgment and thought content normal. Cognition and memory are normal.   Nursing note and  vitals reviewed.        Assessment:       1. Upper respiratory tract infection, unspecified type    2. Cough        Plan:         Upper respiratory tract infection, unspecified type    Cough  -     XR CHEST PA AND LATERAL; Future; Expected date: 01/14/2020

## 2020-06-08 ENCOUNTER — OFFICE VISIT (OUTPATIENT)
Dept: URGENT CARE | Facility: CLINIC | Age: 45
End: 2020-06-08
Payer: MEDICAID

## 2020-06-08 VITALS
HEIGHT: 68 IN | OXYGEN SATURATION: 96 % | DIASTOLIC BLOOD PRESSURE: 80 MMHG | WEIGHT: 196 LBS | HEART RATE: 78 BPM | SYSTOLIC BLOOD PRESSURE: 120 MMHG | BODY MASS INDEX: 29.7 KG/M2 | TEMPERATURE: 96 F

## 2020-06-08 DIAGNOSIS — M25.511 ACUTE PAIN OF RIGHT SHOULDER: Primary | ICD-10-CM

## 2020-06-08 PROCEDURE — 99214 OFFICE O/P EST MOD 30 MIN: CPT | Mod: S$GLB,,, | Performed by: NURSE PRACTITIONER

## 2020-06-08 PROCEDURE — 99214 PR OFFICE/OUTPT VISIT, EST, LEVL IV, 30-39 MIN: ICD-10-PCS | Mod: S$GLB,,, | Performed by: NURSE PRACTITIONER

## 2020-06-08 PROCEDURE — 73030 XR SHOULDER COMPLETE 2 OR MORE VIEWS RIGHT: ICD-10-PCS | Mod: RT,S$GLB,, | Performed by: RADIOLOGY

## 2020-06-08 PROCEDURE — 73030 X-RAY EXAM OF SHOULDER: CPT | Mod: RT,S$GLB,, | Performed by: RADIOLOGY

## 2020-06-08 RX ORDER — NAPROXEN 500 MG/1
500 TABLET ORAL 2 TIMES DAILY WITH MEALS
Qty: 14 TABLET | Refills: 0 | Status: SHIPPED | OUTPATIENT
Start: 2020-06-08 | End: 2020-06-15

## 2020-06-08 NOTE — PROGRESS NOTES
"Subjective:       Patient ID: Andrea Shields is a 44 y.o. male.    Vitals:  height is 5' 8" (1.727 m) and weight is 88.9 kg (196 lb). His temperature is 96 °F (35.6 °C). His blood pressure is 120/80 and his pulse is 78. His oxygen saturation is 96%.     Chief Complaint: Arm Pain (right)    Pt has been having pain in the right arm radiating from shoulder down to elbow for the last two weeks. Worse with movement. Wakes him up at night. No known injury. He's tried flexeril and previously controlled pain medication at home with minimal relief. No new exercise program or increase in activity level.     Arm Pain    The incident occurred more than 1 week ago. The incident occurred at home. There was no injury mechanism. The pain is present in the right elbow and right shoulder. The quality of the pain is described as aching and shooting. The pain has been constant since the incident. Pertinent negatives include no chest pain.       Constitution: Negative for chills, fatigue and fever.   HENT: Negative for congestion and sore throat.    Neck: Negative for painful lymph nodes.   Cardiovascular: Negative for chest pain and leg swelling.   Eyes: Negative for double vision and blurred vision.   Respiratory: Negative for cough and shortness of breath.    Gastrointestinal: Negative for nausea, vomiting and diarrhea.   Genitourinary: Negative for dysuria, frequency and urgency.   Musculoskeletal: Positive for joint pain. Negative for joint swelling, muscle cramps and muscle ache.   Skin: Negative for color change, pale and rash.   Allergic/Immunologic: Negative for seasonal allergies.   Neurological: Negative for dizziness, history of vertigo, light-headedness, passing out and headaches.   Hematologic/Lymphatic: Negative for swollen lymph nodes, easy bruising/bleeding and history of blood clots. Does not bruise/bleed easily.   Psychiatric/Behavioral: Negative for nervous/anxious, sleep disturbance and depression. The patient is " not nervous/anxious.        Objective:      Physical Exam   Constitutional: He is oriented to person, place, and time. Vital signs are normal. He appears well-developed and well-nourished. He is active and cooperative. No distress.   HENT:   Head: Normocephalic and atraumatic.   Nose: Nose normal.   Mouth/Throat: Oropharynx is clear and moist and mucous membranes are normal.   Eyes: Conjunctivae and lids are normal.   Neck: Trachea normal, normal range of motion, full passive range of motion without pain and phonation normal. Neck supple.   Cardiovascular: Normal rate, regular rhythm, normal heart sounds, intact distal pulses and normal pulses.   Pulmonary/Chest: Effort normal and breath sounds normal.   Abdominal: Soft. Normal appearance and bowel sounds are normal. He exhibits no abdominal bruit, no pulsatile midline mass and no mass.   Musculoskeletal: He exhibits no edema or deformity.        Right shoulder: He exhibits pain. He exhibits normal range of motion, no tenderness, no bony tenderness, no swelling, no crepitus, no deformity, no laceration, no spasm, normal pulse and normal strength.        Right elbow: Normal.  Subjective pain with ROM- flexion and extension, abduction, internal/external rotation. Open can test negative. No tenderness.    Neurological: He is alert and oriented to person, place, and time. He has normal strength and normal reflexes. No sensory deficit.   Skin: Skin is warm, dry, intact and not diaphoretic.   Psychiatric: He has a normal mood and affect. His speech is normal and behavior is normal. Judgment and thought content normal. Cognition and memory are normal.   Nursing note and vitals reviewed.  Xr Shoulder Complete 2 Or More Views Right    Result Date: 6/8/2020  EXAMINATION: XR SHOULDER COMPLETE 2 OR MORE VIEWS RIGHT CLINICAL HISTORY: Pain in right shoulder TECHNIQUE: XR SHOULDER COMPLETE 2 OR MORE VIEWS RIGHT COMPARISON: None FINDINGS: Glenohumeral joint is located.  There is  some marginal osteophytosis of the medial humeral head with little, if any, glenohumeral joint space narrowing.  Mild AC joint osteoarthritis is present.     Mild osteoarthritis of the glenohumeral and AC joint Electronically signed by: Tim Quezada Jr Date:    06/08/2020 Time:    14:13        Assessment:       1. Acute pain of right shoulder        Plan:         Acute pain of right shoulder  -     XR SHOULDER COMPLETE 2 OR MORE VIEWS RIGHT; Future; Expected date: 06/08/2020  -     naproxen (NAPROSYN) 500 MG tablet; Take 1 tablet (500 mg total) by mouth 2 (two) times daily with meals. for 7 days  Dispense: 14 tablet; Refill: 0  -     Ambulatory referral/consult to Orthopedics         Reviewed previous pertinent office visits, PMH, PSH, fam hx  Start short course of naproxen  F/u with ortho. Referral placed.  Advised on return/follow-up precautions. Advised on ER precautions. Answered all patient questions. Patient verbalized understanding and voiced agreement with current treatment plan.    Patient Instructions   Start naproxen twice daily  Follow up with orthopedic doctor      Shoulder Pain with Uncertain Cause  Shoulder pain can have many causes. Pain often comes from the structures that surround the shoulder joint. These are the joint capsule, ligaments, tendons, muscles, and bursa. Pain can also come from cartilage in the joint. Cartilage can become worn out or injured. Its important to know whats causing your pain so the healthcare provider can use the correct treatment. But sometimes its difficult to find the exact cause of shoulder pain. You may need to see a specialist (orthopedist). You may also need special tests such as a CT scan or MRI. The provider may need to use special tools to look inside the joint (arthroscopy).  Shoulder pain can be treated with a sling or a device that keeps your shoulder from moving. You can take an anti-inflammatory medicine such as ibuprofen to ease pain. You may need  to do special shoulder exercises. Follow up with a specialist if the pain is severe or doesnt go away after a few weeks.  Home care  Follow these tips when caring for yourself at home:  · If a sling was given to you, leave it in place for the time advised by your healthcare provider. If you arent sure how long to wear it, ask for advice. If the sling becomes loose, adjust it so that your forearm is level with the ground. Your shoulder should feel well supported.  · Put an ice pack on the injured area for 20 minutes every 1 to 2 hours the first day. You can make your own ice pack by putting ice cubes in a plastic bag. Wrap the bag in a thin towel. Continue with ice packs 3 to 4 times a day for the next 2 days. Then use the pack as needed to ease pain and swelling.  · You may use acetaminophen or ibuprofen to control pain, unless another pain medicine was prescribed. If you have chronic liver or kidney disease, talk with your healthcare provider before using these medicines. Also talk with your provider if youve ever had a stomach ulcer or GI bleeding.  · Shoulder pain may seem worse at night, when there is less to distract you from the pain. If you sleep on your side, try to keep weight off your painful shoulder. Propping pillows behind you may stop you from rolling over onto that shoulder during sleep.   · Shoulder and elbow joints can become stiff if left in a sling for too long. You should start range of motion exercises about 7 to 10 days after the injury. Talk with your provider to find out what type of exercises to do and how soon to start.  · You can take the sling off to shower or bathe.  Follow-up care  Follow up with your healthcare provider if you dont start to get better in the next 5 days.  When to seek medical advice  Call your healthcare provider right away if any of these occur:  · Pain or swelling gets worse or continues for more than a few days  · Your hand or fingers become cold, blue, numb, or  tingly  · Large amount of bruising on your shoulder or upper arm  · Difficulty moving your hand or fingers  · Weakness in your hand or fingers  · Your shoulder becomes stiff  · It feels like your shoulder is popping out  · You are less able to do your daily activities  Date Last Reviewed: 10/1/2016  © 2453-2682 [employer]. [address] [place] [address], [place] [address]. All rights reserved. This information is not intended as a substitute for professional medical care. Always follow your healthcare professional's instructions.

## 2020-06-08 NOTE — PATIENT INSTRUCTIONS
Start naproxen twice daily  Follow up with orthopedic doctor      Shoulder Pain with Uncertain Cause  Shoulder pain can have many causes. Pain often comes from the structures that surround the shoulder joint. These are the joint capsule, ligaments, tendons, muscles, and bursa. Pain can also come from cartilage in the joint. Cartilage can become worn out or injured. Its important to know whats causing your pain so the healthcare provider can use the correct treatment. But sometimes its difficult to find the exact cause of shoulder pain. You may need to see a specialist (orthopedist). You may also need special tests such as a CT scan or MRI. The provider may need to use special tools to look inside the joint (arthroscopy).  Shoulder pain can be treated with a sling or a device that keeps your shoulder from moving. You can take an anti-inflammatory medicine such as ibuprofen to ease pain. You may need to do special shoulder exercises. Follow up with a specialist if the pain is severe or doesnt go away after a few weeks.  Home care  Follow these tips when caring for yourself at home:  · If a sling was given to you, leave it in place for the time advised by your healthcare provider. If you arent sure how long to wear it, ask for advice. If the sling becomes loose, adjust it so that your forearm is level with the ground. Your shoulder should feel well supported.  · Put an ice pack on the injured area for 20 minutes every 1 to 2 hours the first day. You can make your own ice pack by putting ice cubes in a plastic bag. Wrap the bag in a thin towel. Continue with ice packs 3 to 4 times a day for the next 2 days. Then use the pack as needed to ease pain and swelling.  · You may use acetaminophen or ibuprofen to control pain, unless another pain medicine was prescribed. If you have chronic liver or kidney disease, talk with your healthcare provider before using these medicines. Also talk with your provider if youve ever  had a stomach ulcer or GI bleeding.  · Shoulder pain may seem worse at night, when there is less to distract you from the pain. If you sleep on your side, try to keep weight off your painful shoulder. Propping pillows behind you may stop you from rolling over onto that shoulder during sleep.   · Shoulder and elbow joints can become stiff if left in a sling for too long. You should start range of motion exercises about 7 to 10 days after the injury. Talk with your provider to find out what type of exercises to do and how soon to start.  · You can take the sling off to shower or bathe.  Follow-up care  Follow up with your healthcare provider if you dont start to get better in the next 5 days.  When to seek medical advice  Call your healthcare provider right away if any of these occur:  · Pain or swelling gets worse or continues for more than a few days  · Your hand or fingers become cold, blue, numb, or tingly  · Large amount of bruising on your shoulder or upper arm  · Difficulty moving your hand or fingers  · Weakness in your hand or fingers  · Your shoulder becomes stiff  · It feels like your shoulder is popping out  · You are less able to do your daily activities  Date Last Reviewed: 10/1/2016  © 2353-9362 [employer]. [address] [place] [address], [place] [address]. All rights reserved. This information is not intended as a substitute for professional medical care. Always follow your healthcare professional's instructions.

## 2020-07-04 ENCOUNTER — OFFICE VISIT (OUTPATIENT)
Dept: URGENT CARE | Facility: CLINIC | Age: 45
End: 2020-07-04
Payer: MEDICAID

## 2020-07-04 VITALS
DIASTOLIC BLOOD PRESSURE: 73 MMHG | HEART RATE: 77 BPM | OXYGEN SATURATION: 98 % | SYSTOLIC BLOOD PRESSURE: 112 MMHG | TEMPERATURE: 96 F

## 2020-07-04 DIAGNOSIS — M25.561 ACUTE PAIN OF RIGHT KNEE: Primary | ICD-10-CM
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
## 2020-07-04 PROCEDURE — 73562 X-RAY EXAM OF KNEE 3: CPT | Mod: RT,S$GLB,ICN, | Performed by: RADIOLOGY

## 2020-07-04 PROCEDURE — 99214 OFFICE O/P EST MOD 30 MIN: CPT | Mod: S$GLB,,, | Performed by: INTERNAL MEDICINE

## 2020-07-04 PROCEDURE — 73562 XR KNEE 3 VIEW RIGHT: ICD-10-PCS | Mod: RT,S$GLB,ICN, | Performed by: RADIOLOGY

## 2020-07-04 PROCEDURE — 99214 PR OFFICE/OUTPT VISIT, EST, LEVL IV, 30-39 MIN: ICD-10-PCS | Mod: S$GLB,,, | Performed by: INTERNAL MEDICINE

## 2020-07-04 RX ORDER — KETOROLAC TROMETHAMINE 30 MG/ML
30 INJECTION, SOLUTION INTRAMUSCULAR; INTRAVENOUS
Status: COMPLETED | OUTPATIENT
Start: 2020-07-04 | End: 2020-07-04

## 2020-07-04 RX ADMIN — KETOROLAC TROMETHAMINE 30 MG: 30 INJECTION, SOLUTION INTRAMUSCULAR; INTRAVENOUS at 04:07
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
## 2020-10-14 ENCOUNTER — HOSPITAL ENCOUNTER (OUTPATIENT)
Dept: RADIOLOGY | Facility: HOSPITAL | Age: 45
Discharge: HOME | End: 2020-10-14
Attending: ORTHOPAEDIC SURGERY
Payer: MEDICAID

## 2020-10-14 DIAGNOSIS — G89.29 CHRONIC LEFT SHOULDER PAIN: Primary | ICD-10-CM

## 2020-10-14 DIAGNOSIS — M25.512 CHRONIC LEFT SHOULDER PAIN: ICD-10-CM

## 2020-10-14 DIAGNOSIS — M25.512 CHRONIC LEFT SHOULDER PAIN: Primary | ICD-10-CM

## 2020-10-14 DIAGNOSIS — G89.29 CHRONIC LEFT SHOULDER PAIN: ICD-10-CM

## 2020-10-14 PROCEDURE — 73030 X-RAY EXAM OF SHOULDER: CPT | Mod: TC,FY,LT

## 2020-10-14 PROCEDURE — 73030 XR SHOULDER COMPLETE 2 OR MORE VIEWS LEFT: ICD-10-PCS | Mod: 26,LT,, | Performed by: RADIOLOGY

## 2020-10-14 PROCEDURE — 73030 X-RAY EXAM OF SHOULDER: CPT | Mod: 26,LT,, | Performed by: RADIOLOGY

## 2021-01-07 ENCOUNTER — OFFICE VISIT (OUTPATIENT)
Dept: URGENT CARE | Facility: CLINIC | Age: 46
End: 2021-01-07
Payer: MEDICAID

## 2021-01-07 VITALS
TEMPERATURE: 97 F | HEART RATE: 88 BPM | HEIGHT: 68 IN | BODY MASS INDEX: 33.34 KG/M2 | OXYGEN SATURATION: 100 % | DIASTOLIC BLOOD PRESSURE: 73 MMHG | SYSTOLIC BLOOD PRESSURE: 111 MMHG | WEIGHT: 220 LBS | RESPIRATION RATE: 18 BRPM

## 2021-01-07 DIAGNOSIS — R10.9 STOMACH ACHE: ICD-10-CM

## 2021-01-07 DIAGNOSIS — Z11.9 SCREENING EXAMINATION FOR INFECTIOUS DISEASE: Primary | ICD-10-CM

## 2021-01-07 DIAGNOSIS — K52.9 GASTROENTERITIS: ICD-10-CM

## 2021-01-07 LAB
BILIRUB UR QL STRIP: NEGATIVE
CTP QC/QA: YES
GLUCOSE UR QL STRIP: NEGATIVE
KETONES UR QL STRIP: POSITIVE
LEUKOCYTE ESTERASE UR QL STRIP: NEGATIVE
PH, POC UA: 9 (ref 5–8)
POC BLOOD, URINE: NEGATIVE
POC NITRATES, URINE: NEGATIVE
PROT UR QL STRIP: POSITIVE
SARS-COV-2 RDRP RESP QL NAA+PROBE: NEGATIVE
SP GR UR STRIP: 1 (ref 1–1.03)
UROBILINOGEN UR STRIP-ACNC: 4 (ref 0.3–2.2)

## 2021-01-07 PROCEDURE — U0002 COVID-19 LAB TEST NON-CDC: HCPCS | Mod: QW,S$GLB,, | Performed by: PHYSICIAN ASSISTANT

## 2021-01-07 PROCEDURE — 81003 POCT URINALYSIS, DIPSTICK, AUTOMATED, W/O SCOPE: ICD-10-PCS | Mod: QW,S$GLB,, | Performed by: PHYSICIAN ASSISTANT

## 2021-01-07 PROCEDURE — 81003 URINALYSIS AUTO W/O SCOPE: CPT | Mod: QW,S$GLB,, | Performed by: PHYSICIAN ASSISTANT

## 2021-01-07 PROCEDURE — 99214 OFFICE O/P EST MOD 30 MIN: CPT | Mod: 25,S$GLB,, | Performed by: PHYSICIAN ASSISTANT

## 2021-01-07 PROCEDURE — U0002: ICD-10-PCS | Mod: QW,S$GLB,, | Performed by: PHYSICIAN ASSISTANT

## 2021-01-07 PROCEDURE — 99214 PR OFFICE/OUTPT VISIT, EST, LEVL IV, 30-39 MIN: ICD-10-PCS | Mod: 25,S$GLB,, | Performed by: PHYSICIAN ASSISTANT

## 2021-01-07 RX ORDER — ONDANSETRON 4 MG/1
4 TABLET, ORALLY DISINTEGRATING ORAL EVERY 6 HOURS PRN
Qty: 20 TABLET | Refills: 0 | Status: SHIPPED | OUTPATIENT
Start: 2021-01-07 | End: 2021-02-28

## 2021-01-07 RX ORDER — DICYCLOMINE HYDROCHLORIDE 20 MG/1
20 TABLET ORAL 3 TIMES DAILY
Qty: 20 TABLET | Refills: 0 | Status: SHIPPED | OUTPATIENT
Start: 2021-01-07 | End: 2021-02-06

## 2021-02-03 ENCOUNTER — OFFICE VISIT (OUTPATIENT)
Dept: URGENT CARE | Facility: CLINIC | Age: 46
End: 2021-02-03
Payer: MEDICAID

## 2021-02-03 VITALS
SYSTOLIC BLOOD PRESSURE: 144 MMHG | TEMPERATURE: 98 F | OXYGEN SATURATION: 96 % | DIASTOLIC BLOOD PRESSURE: 88 MMHG | HEART RATE: 72 BPM

## 2021-02-03 DIAGNOSIS — J06.9 VIRAL URI: Primary | ICD-10-CM

## 2021-02-03 DIAGNOSIS — R03.0 ELEVATED BP WITHOUT DIAGNOSIS OF HYPERTENSION: ICD-10-CM

## 2021-02-03 LAB
CTP QC/QA: YES
SARS-COV-2 RDRP RESP QL NAA+PROBE: NEGATIVE

## 2021-02-03 PROCEDURE — U0002 COVID-19 LAB TEST NON-CDC: HCPCS | Mod: QW,S$GLB,, | Performed by: PHYSICIAN ASSISTANT

## 2021-02-03 PROCEDURE — 99214 OFFICE O/P EST MOD 30 MIN: CPT | Mod: S$GLB,,, | Performed by: PHYSICIAN ASSISTANT

## 2021-02-03 PROCEDURE — U0002: ICD-10-PCS | Mod: QW,S$GLB,, | Performed by: PHYSICIAN ASSISTANT

## 2021-02-03 PROCEDURE — 99214 PR OFFICE/OUTPT VISIT, EST, LEVL IV, 30-39 MIN: ICD-10-PCS | Mod: S$GLB,,, | Performed by: PHYSICIAN ASSISTANT

## 2021-02-03 PROCEDURE — 71046 XR CHEST PA AND LATERAL: ICD-10-PCS | Mod: S$GLB,,, | Performed by: RADIOLOGY

## 2021-02-03 PROCEDURE — 71046 X-RAY EXAM CHEST 2 VIEWS: CPT | Mod: S$GLB,,, | Performed by: RADIOLOGY

## 2021-02-27 PROCEDURE — 99281 EMR DPT VST MAYX REQ PHY/QHP: CPT | Mod: ER

## 2021-02-28 ENCOUNTER — HOSPITAL ENCOUNTER (EMERGENCY)
Facility: HOSPITAL | Age: 46
Discharge: HOME | End: 2021-02-28
Attending: EMERGENCY MEDICINE
Payer: MEDICAID

## 2021-02-28 VITALS
HEART RATE: 71 BPM | HEIGHT: 68 IN | DIASTOLIC BLOOD PRESSURE: 78 MMHG | RESPIRATION RATE: 18 BRPM | TEMPERATURE: 98 F | SYSTOLIC BLOOD PRESSURE: 118 MMHG | OXYGEN SATURATION: 99 % | WEIGHT: 190 LBS | BODY MASS INDEX: 28.79 KG/M2

## 2021-02-28 DIAGNOSIS — K42.9 UMBILICAL HERNIA WITHOUT OBSTRUCTION AND WITHOUT GANGRENE: Primary | ICD-10-CM

## 2021-02-28 RX ORDER — MELOXICAM 15 MG/1
15 TABLET ORAL DAILY
COMMUNITY
Start: 2021-01-10 | End: 2021-03-30

## 2021-03-18 ENCOUNTER — HOSPITAL ENCOUNTER (EMERGENCY)
Facility: HOSPITAL | Age: 46
Discharge: HOME | End: 2021-03-18
Attending: EMERGENCY MEDICINE
Payer: MEDICAID

## 2021-03-18 VITALS
TEMPERATURE: 97 F | BODY MASS INDEX: 28.04 KG/M2 | HEIGHT: 68 IN | HEART RATE: 71 BPM | DIASTOLIC BLOOD PRESSURE: 82 MMHG | WEIGHT: 185 LBS | OXYGEN SATURATION: 98 % | SYSTOLIC BLOOD PRESSURE: 122 MMHG | RESPIRATION RATE: 16 BRPM

## 2021-03-18 DIAGNOSIS — S62.336A CLOSED DISPLACED FRACTURE OF NECK OF FIFTH METACARPAL BONE OF RIGHT HAND, INITIAL ENCOUNTER: Primary | ICD-10-CM

## 2021-03-18 PROCEDURE — 29125 APPL SHORT ARM SPLINT STATIC: CPT | Mod: RT,ER

## 2021-03-18 PROCEDURE — 25000003 PHARM REV CODE 250: Mod: ER | Performed by: EMERGENCY MEDICINE

## 2021-03-18 PROCEDURE — 99283 EMERGENCY DEPT VISIT LOW MDM: CPT | Mod: 25,ER

## 2021-03-18 RX ORDER — IBUPROFEN 400 MG/1
800 TABLET ORAL
Status: COMPLETED | OUTPATIENT
Start: 2021-03-18 | End: 2021-03-18

## 2021-03-18 RX ORDER — KETOROLAC TROMETHAMINE 10 MG/1
10 TABLET, FILM COATED ORAL EVERY 6 HOURS PRN
Qty: 12 TABLET | Refills: 0 | Status: SHIPPED | OUTPATIENT
Start: 2021-03-18 | End: 2021-03-21

## 2021-03-18 RX ADMIN — IBUPROFEN 800 MG: 400 TABLET, FILM COATED ORAL at 09:03

## 2021-03-30 ENCOUNTER — HOSPITAL ENCOUNTER (EMERGENCY)
Facility: HOSPITAL | Age: 46
Discharge: HOME | End: 2021-03-30
Attending: EMERGENCY MEDICINE
Payer: MEDICAID

## 2021-03-30 VITALS
OXYGEN SATURATION: 100 % | DIASTOLIC BLOOD PRESSURE: 88 MMHG | BODY MASS INDEX: 28.04 KG/M2 | WEIGHT: 185 LBS | RESPIRATION RATE: 16 BRPM | HEART RATE: 72 BPM | SYSTOLIC BLOOD PRESSURE: 137 MMHG | HEIGHT: 68 IN | TEMPERATURE: 98 F

## 2021-03-30 DIAGNOSIS — S62.339D CLOSED BOXER'S FRACTURE WITH ROUTINE HEALING, SUBSEQUENT ENCOUNTER: ICD-10-CM

## 2021-03-30 DIAGNOSIS — J32.9 CHRONIC SINUSITIS, UNSPECIFIED LOCATION: Primary | ICD-10-CM

## 2021-03-30 DIAGNOSIS — K42.9 UMBILICAL HERNIA WITHOUT OBSTRUCTION AND WITHOUT GANGRENE: ICD-10-CM

## 2021-03-30 PROCEDURE — 25000003 PHARM REV CODE 250: Mod: ER | Performed by: EMERGENCY MEDICINE

## 2021-03-30 PROCEDURE — 99284 EMERGENCY DEPT VISIT MOD MDM: CPT | Mod: 25,ER

## 2021-03-30 PROCEDURE — 63600175 PHARM REV CODE 636 W HCPCS: Mod: ER | Performed by: EMERGENCY MEDICINE

## 2021-03-30 PROCEDURE — 96372 THER/PROPH/DIAG INJ SC/IM: CPT | Mod: ER,59

## 2021-03-30 PROCEDURE — 29125 APPL SHORT ARM SPLINT STATIC: CPT | Mod: RT,ER

## 2021-03-30 RX ORDER — ALBUTEROL SULFATE 90 UG/1
1-2 AEROSOL, METERED RESPIRATORY (INHALATION) EVERY 6 HOURS PRN
Qty: 18 G | Refills: 1 | Status: SHIPPED | OUTPATIENT
Start: 2021-03-30 | End: 2022-03-30

## 2021-03-30 RX ORDER — DEXAMETHASONE SODIUM PHOSPHATE 4 MG/ML
12 INJECTION, SOLUTION INTRA-ARTICULAR; INTRALESIONAL; INTRAMUSCULAR; INTRAVENOUS; SOFT TISSUE
Status: COMPLETED | OUTPATIENT
Start: 2021-03-30 | End: 2021-03-30

## 2021-03-30 RX ORDER — ALBUTEROL SULFATE 90 UG/1
1-2 AEROSOL, METERED RESPIRATORY (INHALATION) EVERY 6 HOURS PRN
Qty: 18 G | Refills: 1 | Status: SHIPPED | OUTPATIENT
Start: 2021-03-30 | End: 2021-03-30 | Stop reason: SDUPTHER

## 2021-03-30 RX ORDER — OXYCODONE AND ACETAMINOPHEN 7.5; 325 MG/1; MG/1
1 TABLET ORAL EVERY 4 HOURS PRN
Qty: 18 TABLET | Refills: 0 | Status: SHIPPED | OUTPATIENT
Start: 2021-03-30 | End: 2021-04-02

## 2021-03-30 RX ORDER — AMOXICILLIN AND CLAVULANATE POTASSIUM 875; 125 MG/1; MG/1
1 TABLET, FILM COATED ORAL 2 TIMES DAILY
Qty: 20 TABLET | Refills: 0 | Status: SHIPPED | OUTPATIENT
Start: 2021-03-30 | End: 2021-04-09

## 2021-03-30 RX ORDER — OXYCODONE AND ACETAMINOPHEN 5; 325 MG/1; MG/1
2 TABLET ORAL
Status: COMPLETED | OUTPATIENT
Start: 2021-03-30 | End: 2021-03-30

## 2021-03-30 RX ORDER — FLUTICASONE PROPIONATE 50 MCG
1 SPRAY, SUSPENSION (ML) NASAL 2 TIMES DAILY
Qty: 16 G | Refills: 0 | Status: SHIPPED | OUTPATIENT
Start: 2021-03-30 | End: 2021-04-13

## 2021-03-30 RX ORDER — ALBUTEROL SULFATE 90 UG/1
1-2 AEROSOL, METERED RESPIRATORY (INHALATION) EVERY 6 HOURS PRN
Qty: 18 G | Refills: 1 | Status: SHIPPED | OUTPATIENT
Start: 2021-03-30 | End: 2021-04-05 | Stop reason: CLARIF

## 2021-03-30 RX ADMIN — DEXAMETHASONE SODIUM PHOSPHATE 12 MG: 4 INJECTION INTRA-ARTICULAR; INTRALESIONAL; INTRAMUSCULAR; INTRAVENOUS; SOFT TISSUE at 07:03

## 2021-03-30 RX ADMIN — OXYCODONE HYDROCHLORIDE AND ACETAMINOPHEN 2 TABLET: 5; 325 TABLET ORAL at 08:03

## 2021-04-05 ENCOUNTER — HOSPITAL ENCOUNTER (OUTPATIENT)
Dept: PREADMISSION TESTING | Facility: HOSPITAL | Age: 46
Discharge: HOME | End: 2021-04-05
Attending: SURGERY
Payer: MEDICAID

## 2021-04-05 VITALS
RESPIRATION RATE: 16 BRPM | DIASTOLIC BLOOD PRESSURE: 76 MMHG | WEIGHT: 182.56 LBS | HEIGHT: 68 IN | SYSTOLIC BLOOD PRESSURE: 120 MMHG | OXYGEN SATURATION: 100 % | BODY MASS INDEX: 27.67 KG/M2 | TEMPERATURE: 97 F

## 2021-04-05 DIAGNOSIS — Z01.818 PREOP TESTING: Primary | ICD-10-CM

## 2021-04-05 LAB
ANION GAP SERPL CALC-SCNC: 6 MMOL/L (ref 8–16)
BASOPHILS # BLD AUTO: 0.04 K/UL (ref 0–0.2)
BASOPHILS NFR BLD: 0.5 % (ref 0–1.9)
BUN SERPL-MCNC: 20 MG/DL (ref 6–20)
CALCIUM SERPL-MCNC: 8.6 MG/DL (ref 8.7–10.5)
CHLORIDE SERPL-SCNC: 105 MMOL/L (ref 95–110)
CO2 SERPL-SCNC: 28 MMOL/L (ref 23–29)
CREAT SERPL-MCNC: 0.8 MG/DL (ref 0.5–1.4)
DIFFERENTIAL METHOD: ABNORMAL
EOSINOPHIL # BLD AUTO: 0.5 K/UL (ref 0–0.5)
EOSINOPHIL NFR BLD: 6.1 % (ref 0–8)
ERYTHROCYTE [DISTWIDTH] IN BLOOD BY AUTOMATED COUNT: 13.5 % (ref 11.5–14.5)
EST. GFR  (AFRICAN AMERICAN): >60 ML/MIN/1.73 M^2
EST. GFR  (NON AFRICAN AMERICAN): >60 ML/MIN/1.73 M^2
GLUCOSE SERPL-MCNC: 94 MG/DL (ref 70–110)
HCT VFR BLD AUTO: 39 % (ref 40–54)
HGB BLD-MCNC: 12.9 G/DL (ref 14–18)
IMM GRANULOCYTES # BLD AUTO: 0.01 K/UL (ref 0–0.04)
IMM GRANULOCYTES NFR BLD AUTO: 0.1 % (ref 0–0.5)
LYMPHOCYTES # BLD AUTO: 2.1 K/UL (ref 1–4.8)
LYMPHOCYTES NFR BLD: 27.5 % (ref 18–48)
MCH RBC QN AUTO: 30.5 PG (ref 27–31)
MCHC RBC AUTO-ENTMCNC: 33.1 G/DL (ref 32–36)
MCV RBC AUTO: 92 FL (ref 82–98)
MONOCYTES # BLD AUTO: 0.5 K/UL (ref 0.3–1)
MONOCYTES NFR BLD: 6.1 % (ref 4–15)
NEUTROPHILS # BLD AUTO: 4.5 K/UL (ref 1.8–7.7)
NEUTROPHILS NFR BLD: 59.7 % (ref 38–73)
NRBC BLD-RTO: 0 /100 WBC
PLATELET # BLD AUTO: 267 K/UL (ref 150–450)
PMV BLD AUTO: 10.2 FL (ref 9.2–12.9)
POTASSIUM SERPL-SCNC: 5 MMOL/L (ref 3.5–5.1)
RBC # BLD AUTO: 4.23 M/UL (ref 4.6–6.2)
SARS-COV-2 RDRP RESP QL NAA+PROBE: NEGATIVE
SODIUM SERPL-SCNC: 139 MMOL/L (ref 136–145)
WBC # BLD AUTO: 7.48 K/UL (ref 3.9–12.7)

## 2021-04-05 PROCEDURE — U0002 COVID-19 LAB TEST NON-CDC: HCPCS | Performed by: SURGERY

## 2021-04-05 PROCEDURE — 85025 COMPLETE CBC W/AUTO DIFF WBC: CPT | Performed by: SURGERY

## 2021-04-05 PROCEDURE — 80048 BASIC METABOLIC PNL TOTAL CA: CPT | Performed by: SURGERY

## 2021-04-05 PROCEDURE — 36415 COLL VENOUS BLD VENIPUNCTURE: CPT | Performed by: SURGERY

## 2021-04-07 ENCOUNTER — ANESTHESIA EVENT (OUTPATIENT)
Dept: SURGERY | Facility: HOSPITAL | Age: 46
End: 2021-04-07
Payer: MEDICAID

## 2021-04-08 ENCOUNTER — HOSPITAL ENCOUNTER (OUTPATIENT)
Facility: HOSPITAL | Age: 46
Discharge: HOME | End: 2021-04-08
Attending: SURGERY | Admitting: SURGERY
Payer: MEDICAID

## 2021-04-08 ENCOUNTER — ANESTHESIA (OUTPATIENT)
Dept: SURGERY | Facility: HOSPITAL | Age: 46
End: 2021-04-08
Payer: MEDICAID

## 2021-04-08 VITALS
WEIGHT: 182.56 LBS | RESPIRATION RATE: 18 BRPM | DIASTOLIC BLOOD PRESSURE: 89 MMHG | BODY MASS INDEX: 27.76 KG/M2 | OXYGEN SATURATION: 100 % | TEMPERATURE: 99 F | HEART RATE: 75 BPM | SYSTOLIC BLOOD PRESSURE: 132 MMHG

## 2021-04-08 DIAGNOSIS — Z01.818 PREOP TESTING: ICD-10-CM

## 2021-04-08 DIAGNOSIS — K43.9 VENTRAL HERNIA WITHOUT OBSTRUCTION OR GANGRENE: Primary | ICD-10-CM

## 2021-04-08 PROCEDURE — 71000033 HC RECOVERY, INTIAL HOUR: Performed by: SURGERY

## 2021-04-08 PROCEDURE — 71000016 HC POSTOP RECOV ADDL HR: Performed by: SURGERY

## 2021-04-08 PROCEDURE — 63600175 PHARM REV CODE 636 W HCPCS: Performed by: ANESTHESIOLOGY

## 2021-04-08 PROCEDURE — D9220A PRA ANESTHESIA: Mod: ANES,,, | Performed by: ANESTHESIOLOGY

## 2021-04-08 PROCEDURE — 71000015 HC POSTOP RECOV 1ST HR: Performed by: SURGERY

## 2021-04-08 PROCEDURE — 25000003 PHARM REV CODE 250: Performed by: ANESTHESIOLOGY

## 2021-04-08 PROCEDURE — D9220A PRA ANESTHESIA: Mod: CRNA,,, | Performed by: NURSE ANESTHETIST, CERTIFIED REGISTERED

## 2021-04-08 PROCEDURE — C1781 MESH (IMPLANTABLE): HCPCS | Performed by: SURGERY

## 2021-04-08 PROCEDURE — D9220A PRA ANESTHESIA: ICD-10-PCS | Mod: ANES,,, | Performed by: ANESTHESIOLOGY

## 2021-04-08 PROCEDURE — D9220A PRA ANESTHESIA: ICD-10-PCS | Mod: CRNA,,, | Performed by: NURSE ANESTHETIST, CERTIFIED REGISTERED

## 2021-04-08 PROCEDURE — 00832 ANES HERNIA REPAIR VNT&INCAL: CPT | Performed by: SURGERY

## 2021-04-08 PROCEDURE — 36000706: Performed by: SURGERY

## 2021-04-08 PROCEDURE — 37000008 HC ANESTHESIA 1ST 15 MINUTES: Performed by: SURGERY

## 2021-04-08 PROCEDURE — 25000003 PHARM REV CODE 250: Performed by: SURGERY

## 2021-04-08 PROCEDURE — 36000707: Performed by: SURGERY

## 2021-04-08 PROCEDURE — 25000003 PHARM REV CODE 250: Performed by: NURSE ANESTHETIST, CERTIFIED REGISTERED

## 2021-04-08 PROCEDURE — 63600175 PHARM REV CODE 636 W HCPCS: Performed by: NURSE ANESTHETIST, CERTIFIED REGISTERED

## 2021-04-08 PROCEDURE — 37000009 HC ANESTHESIA EA ADD 15 MINS: Performed by: SURGERY

## 2021-04-08 DEVICE — MESH PARIETEX VENTRAL 4X6: Type: IMPLANTABLE DEVICE | Site: ABDOMEN | Status: FUNCTIONAL

## 2021-04-08 RX ORDER — FENTANYL CITRATE 50 UG/ML
25 INJECTION, SOLUTION INTRAMUSCULAR; INTRAVENOUS EVERY 5 MIN PRN
Status: DISCONTINUED | OUTPATIENT
Start: 2021-04-08 | End: 2021-04-08 | Stop reason: HOSPADM

## 2021-04-08 RX ORDER — ONDANSETRON 2 MG/ML
INJECTION INTRAMUSCULAR; INTRAVENOUS
Status: DISCONTINUED | OUTPATIENT
Start: 2021-04-08 | End: 2021-04-08

## 2021-04-08 RX ORDER — HYDROMORPHONE HYDROCHLORIDE 2 MG/ML
1 INJECTION, SOLUTION INTRAMUSCULAR; INTRAVENOUS; SUBCUTANEOUS
Status: DISCONTINUED | OUTPATIENT
Start: 2021-04-08 | End: 2021-04-08 | Stop reason: HOSPADM

## 2021-04-08 RX ORDER — ACETAMINOPHEN 500 MG
1000 TABLET ORAL
Status: COMPLETED | OUTPATIENT
Start: 2021-04-08 | End: 2021-04-08

## 2021-04-08 RX ORDER — DEXAMETHASONE SODIUM PHOSPHATE 4 MG/ML
INJECTION, SOLUTION INTRA-ARTICULAR; INTRALESIONAL; INTRAMUSCULAR; INTRAVENOUS; SOFT TISSUE
Status: DISCONTINUED | OUTPATIENT
Start: 2021-04-08 | End: 2021-04-08

## 2021-04-08 RX ORDER — NEOSTIGMINE METHYLSULFATE 1 MG/ML
INJECTION, SOLUTION INTRAVENOUS
Status: DISCONTINUED | OUTPATIENT
Start: 2021-04-08 | End: 2021-04-08

## 2021-04-08 RX ORDER — ROCURONIUM BROMIDE 10 MG/ML
INJECTION, SOLUTION INTRAVENOUS
Status: DISCONTINUED | OUTPATIENT
Start: 2021-04-08 | End: 2021-04-08

## 2021-04-08 RX ORDER — BUPIVACAINE HYDROCHLORIDE 2.5 MG/ML
INJECTION, SOLUTION EPIDURAL; INFILTRATION; INTRACAUDAL
Status: DISCONTINUED | OUTPATIENT
Start: 2021-04-08 | End: 2021-04-08 | Stop reason: HOSPADM

## 2021-04-08 RX ORDER — HYDROCODONE BITARTRATE AND ACETAMINOPHEN 10; 325 MG/1; MG/1
1 TABLET ORAL EVERY 4 HOURS PRN
Status: DISCONTINUED | OUTPATIENT
Start: 2021-04-08 | End: 2021-04-08 | Stop reason: HOSPADM

## 2021-04-08 RX ORDER — SODIUM CHLORIDE, SODIUM LACTATE, POTASSIUM CHLORIDE, CALCIUM CHLORIDE 600; 310; 30; 20 MG/100ML; MG/100ML; MG/100ML; MG/100ML
INJECTION, SOLUTION INTRAVENOUS CONTINUOUS
Status: DISCONTINUED | OUTPATIENT
Start: 2021-04-08 | End: 2021-04-08 | Stop reason: HOSPADM

## 2021-04-08 RX ORDER — PROPOFOL 10 MG/ML
VIAL (ML) INTRAVENOUS
Status: DISCONTINUED | OUTPATIENT
Start: 2021-04-08 | End: 2021-04-08

## 2021-04-08 RX ORDER — MIDAZOLAM HYDROCHLORIDE 1 MG/ML
INJECTION, SOLUTION INTRAMUSCULAR; INTRAVENOUS
Status: DISCONTINUED | OUTPATIENT
Start: 2021-04-08 | End: 2021-04-08

## 2021-04-08 RX ORDER — SODIUM CHLORIDE 0.9 % (FLUSH) 0.9 %
3 SYRINGE (ML) INJECTION
Status: DISCONTINUED | OUTPATIENT
Start: 2021-04-08 | End: 2021-04-08 | Stop reason: HOSPADM

## 2021-04-08 RX ORDER — LIDOCAINE HYDROCHLORIDE 20 MG/ML
INJECTION INTRAVENOUS
Status: DISCONTINUED | OUTPATIENT
Start: 2021-04-08 | End: 2021-04-08

## 2021-04-08 RX ORDER — FENTANYL CITRATE 50 UG/ML
INJECTION, SOLUTION INTRAMUSCULAR; INTRAVENOUS
Status: DISCONTINUED | OUTPATIENT
Start: 2021-04-08 | End: 2021-04-08

## 2021-04-08 RX ORDER — SUCCINYLCHOLINE CHLORIDE 20 MG/ML
INJECTION INTRAMUSCULAR; INTRAVENOUS
Status: DISCONTINUED | OUTPATIENT
Start: 2021-04-08 | End: 2021-04-08

## 2021-04-08 RX ORDER — CLINDAMYCIN PHOSPHATE 600 MG/50ML
600 INJECTION, SOLUTION INTRAVENOUS
Status: DISPENSED | OUTPATIENT
Start: 2021-04-08

## 2021-04-08 RX ORDER — HYDROMORPHONE HYDROCHLORIDE 2 MG/ML
0.2 INJECTION, SOLUTION INTRAMUSCULAR; INTRAVENOUS; SUBCUTANEOUS EVERY 5 MIN PRN
Status: DISCONTINUED | OUTPATIENT
Start: 2021-04-08 | End: 2021-04-08 | Stop reason: HOSPADM

## 2021-04-08 RX ADMIN — PROPOFOL 200 MG: 10 INJECTION, EMULSION INTRAVENOUS at 07:04

## 2021-04-08 RX ADMIN — HYDROMORPHONE HYDROCHLORIDE 0.2 MG: 2 INJECTION INTRAMUSCULAR; INTRAVENOUS; SUBCUTANEOUS at 08:04

## 2021-04-08 RX ADMIN — ONDANSETRON 4 MG: 2 INJECTION, SOLUTION INTRAMUSCULAR; INTRAVENOUS at 07:04

## 2021-04-08 RX ADMIN — ROCURONIUM BROMIDE 10 MG: 10 INJECTION, SOLUTION INTRAVENOUS at 07:04

## 2021-04-08 RX ADMIN — NEOSTIGMINE METHYLSULFATE 5 MG: 1 INJECTION INTRAVENOUS at 07:04

## 2021-04-08 RX ADMIN — ACETAMINOPHEN 1000 MG: 500 TABLET, FILM COATED ORAL at 06:04

## 2021-04-08 RX ADMIN — GLYCOPYRROLATE 0.2 MG: 0.2 INJECTION, SOLUTION INTRAMUSCULAR; INTRAVITREAL at 07:04

## 2021-04-08 RX ADMIN — GLYCOPYRROLATE 0.6 MG: 0.2 INJECTION, SOLUTION INTRAMUSCULAR; INTRAVITREAL at 07:04

## 2021-04-08 RX ADMIN — DEXAMETHASONE SODIUM PHOSPHATE 4 MG: 4 INJECTION, SOLUTION INTRAMUSCULAR; INTRAVENOUS at 07:04

## 2021-04-08 RX ADMIN — ROCURONIUM BROMIDE 20 MG: 10 INJECTION, SOLUTION INTRAVENOUS at 07:04

## 2021-04-08 RX ADMIN — HYDROCODONE BITARTRATE AND ACETAMINOPHEN 1 TABLET: 10; 325 TABLET ORAL at 10:04

## 2021-04-08 RX ADMIN — MIDAZOLAM HYDROCHLORIDE 2 MG: 1 INJECTION, SOLUTION INTRAMUSCULAR; INTRAVENOUS at 07:04

## 2021-04-08 RX ADMIN — FENTANYL CITRATE 50 MCG: 50 INJECTION, SOLUTION INTRAMUSCULAR; INTRAVENOUS at 07:04

## 2021-04-08 RX ADMIN — CLINDAMYCIN IN 5 PERCENT DEXTROSE 600 MG: 12 INJECTION, SOLUTION INTRAVENOUS at 07:04

## 2021-04-08 RX ADMIN — FENTANYL CITRATE 100 MCG: 50 INJECTION, SOLUTION INTRAMUSCULAR; INTRAVENOUS at 07:04

## 2021-04-08 RX ADMIN — SODIUM CHLORIDE, SODIUM LACTATE, POTASSIUM CHLORIDE, AND CALCIUM CHLORIDE: .6; .31; .03; .02 INJECTION, SOLUTION INTRAVENOUS at 06:04

## 2021-04-08 RX ADMIN — Medication 100 MG: at 07:04

## 2021-04-08 RX ADMIN — SUCCINYLCHOLINE CHLORIDE 120 MG: 20 INJECTION, SOLUTION INTRAMUSCULAR; INTRAVENOUS at 07:04

## 2021-11-14 ENCOUNTER — HOSPITAL ENCOUNTER (EMERGENCY)
Facility: HOSPITAL | Age: 46
Discharge: HOME | End: 2021-11-14
Attending: EMERGENCY MEDICINE
Payer: MEDICAID

## 2021-11-14 VITALS
OXYGEN SATURATION: 100 % | HEART RATE: 59 BPM | TEMPERATURE: 99 F | DIASTOLIC BLOOD PRESSURE: 98 MMHG | RESPIRATION RATE: 18 BRPM | HEIGHT: 68 IN | WEIGHT: 179.44 LBS | SYSTOLIC BLOOD PRESSURE: 165 MMHG | BODY MASS INDEX: 27.19 KG/M2

## 2021-11-14 DIAGNOSIS — E87.6 HYPOKALEMIA: ICD-10-CM

## 2021-11-14 DIAGNOSIS — R11.2 NON-INTRACTABLE VOMITING WITH NAUSEA, UNSPECIFIED VOMITING TYPE: Primary | ICD-10-CM

## 2021-11-14 LAB
ALBUMIN SERPL BCP-MCNC: 4.2 G/DL (ref 3.5–5.2)
ALP SERPL-CCNC: 97 U/L (ref 55–135)
ALT SERPL W/O P-5'-P-CCNC: 16 U/L (ref 10–44)
ANION GAP SERPL CALC-SCNC: 13 MMOL/L (ref 8–16)
AST SERPL-CCNC: 13 U/L (ref 10–40)
BACTERIA #/AREA URNS AUTO: NORMAL /HPF
BASOPHILS # BLD AUTO: 0.01 K/UL (ref 0–0.2)
BASOPHILS NFR BLD: 0.1 % (ref 0–1.9)
BILIRUB SERPL-MCNC: 0.9 MG/DL (ref 0.1–1)
BILIRUB UR QL STRIP: NEGATIVE
BUN SERPL-MCNC: 12 MG/DL (ref 6–20)
CALCIUM SERPL-MCNC: 10 MG/DL (ref 8.7–10.5)
CHLORIDE SERPL-SCNC: 94 MMOL/L (ref 95–110)
CLARITY UR REFRACT.AUTO: ABNORMAL
CO2 SERPL-SCNC: 31 MMOL/L (ref 23–29)
COLOR UR AUTO: ABNORMAL
CREAT SERPL-MCNC: 0.8 MG/DL (ref 0.5–1.4)
CTP QC/QA: YES
DIFFERENTIAL METHOD: ABNORMAL
EOSINOPHIL # BLD AUTO: 0 K/UL (ref 0–0.5)
EOSINOPHIL NFR BLD: 0 % (ref 0–8)
ERYTHROCYTE [DISTWIDTH] IN BLOOD BY AUTOMATED COUNT: 13.2 % (ref 11.5–14.5)
EST. GFR  (AFRICAN AMERICAN): >60 ML/MIN/1.73 M^2
EST. GFR  (NON AFRICAN AMERICAN): >60 ML/MIN/1.73 M^2
GLUCOSE SERPL-MCNC: 131 MG/DL (ref 70–110)
GLUCOSE UR QL STRIP: NEGATIVE
HCT VFR BLD AUTO: 46.2 % (ref 40–54)
HGB BLD-MCNC: 15.6 G/DL (ref 14–18)
HGB UR QL STRIP: NEGATIVE
HYALINE CASTS UR QL AUTO: 0 /LPF
IMM GRANULOCYTES # BLD AUTO: 0.04 K/UL (ref 0–0.04)
IMM GRANULOCYTES NFR BLD AUTO: 0.4 % (ref 0–0.5)
KETONES UR QL STRIP: ABNORMAL
LEUKOCYTE ESTERASE UR QL STRIP: NEGATIVE
LIPASE SERPL-CCNC: 29 U/L (ref 4–60)
LYMPHOCYTES # BLD AUTO: 1.3 K/UL (ref 1–4.8)
LYMPHOCYTES NFR BLD: 11.9 % (ref 18–48)
MCH RBC QN AUTO: 30.2 PG (ref 27–31)
MCHC RBC AUTO-ENTMCNC: 33.8 G/DL (ref 32–36)
MCV RBC AUTO: 90 FL (ref 82–98)
MICROSCOPIC COMMENT: NORMAL
MONOCYTES # BLD AUTO: 0.6 K/UL (ref 0.3–1)
MONOCYTES NFR BLD: 5.1 % (ref 4–15)
NEUTROPHILS # BLD AUTO: 9 K/UL (ref 1.8–7.7)
NEUTROPHILS NFR BLD: 82.5 % (ref 38–73)
NITRITE UR QL STRIP: NEGATIVE
NRBC BLD-RTO: 0 /100 WBC
PH UR STRIP: 6 [PH] (ref 5–8)
PLATELET # BLD AUTO: 256 K/UL (ref 150–450)
PMV BLD AUTO: 9.7 FL (ref 9.2–12.9)
POTASSIUM SERPL-SCNC: 2.9 MMOL/L (ref 3.5–5.1)
PROT SERPL-MCNC: 8.2 G/DL (ref 6–8.4)
PROT UR QL STRIP: ABNORMAL
RBC # BLD AUTO: 5.16 M/UL (ref 4.6–6.2)
RBC #/AREA URNS AUTO: 2 /HPF (ref 0–4)
SARS-COV-2 RDRP RESP QL NAA+PROBE: NEGATIVE
SODIUM SERPL-SCNC: 138 MMOL/L (ref 136–145)
SP GR UR STRIP: 1.03 (ref 1–1.03)
URN SPEC COLLECT METH UR: ABNORMAL
WBC # BLD AUTO: 10.92 K/UL (ref 3.9–12.7)
WBC #/AREA URNS AUTO: 1 /HPF (ref 0–5)

## 2021-11-14 PROCEDURE — 99284 EMERGENCY DEPT VISIT MOD MDM: CPT | Mod: 25

## 2021-11-14 PROCEDURE — 96374 THER/PROPH/DIAG INJ IV PUSH: CPT

## 2021-11-14 PROCEDURE — 80053 COMPREHEN METABOLIC PANEL: CPT | Performed by: PHYSICIAN ASSISTANT

## 2021-11-14 PROCEDURE — 63600175 PHARM REV CODE 636 W HCPCS: Performed by: PHYSICIAN ASSISTANT

## 2021-11-14 PROCEDURE — 87389 HIV-1 AG W/HIV-1&-2 AB AG IA: CPT | Performed by: EMERGENCY MEDICINE

## 2021-11-14 PROCEDURE — 81001 URINALYSIS AUTO W/SCOPE: CPT | Performed by: PHYSICIAN ASSISTANT

## 2021-11-14 PROCEDURE — 85025 COMPLETE CBC W/AUTO DIFF WBC: CPT | Performed by: PHYSICIAN ASSISTANT

## 2021-11-14 PROCEDURE — 25000003 PHARM REV CODE 250: Performed by: PHYSICIAN ASSISTANT

## 2021-11-14 PROCEDURE — 96361 HYDRATE IV INFUSION ADD-ON: CPT

## 2021-11-14 PROCEDURE — 99284 PR EMERGENCY DEPT VISIT,LEVEL IV: ICD-10-PCS | Mod: CS,,, | Performed by: PHYSICIAN ASSISTANT

## 2021-11-14 PROCEDURE — U0002 COVID-19 LAB TEST NON-CDC: HCPCS | Performed by: PHYSICIAN ASSISTANT

## 2021-11-14 PROCEDURE — 86803 HEPATITIS C AB TEST: CPT | Performed by: EMERGENCY MEDICINE

## 2021-11-14 PROCEDURE — 96375 TX/PRO/DX INJ NEW DRUG ADDON: CPT

## 2021-11-14 PROCEDURE — 83690 ASSAY OF LIPASE: CPT | Performed by: PHYSICIAN ASSISTANT

## 2021-11-14 PROCEDURE — 99284 EMERGENCY DEPT VISIT MOD MDM: CPT | Mod: CS,,, | Performed by: PHYSICIAN ASSISTANT

## 2021-11-14 RX ORDER — POTASSIUM CHLORIDE 20 MEQ/1
40 TABLET, EXTENDED RELEASE ORAL ONCE
Status: COMPLETED | OUTPATIENT
Start: 2021-11-14 | End: 2021-11-14

## 2021-11-14 RX ORDER — ONDANSETRON 4 MG/1
4 TABLET, FILM COATED ORAL EVERY 6 HOURS
Qty: 12 TABLET | Refills: 0 | Status: ON HOLD | OUTPATIENT
Start: 2021-11-14 | End: 2023-06-12 | Stop reason: HOSPADM

## 2021-11-14 RX ORDER — PANTOPRAZOLE SODIUM 20 MG/1
20 TABLET, DELAYED RELEASE ORAL DAILY
Qty: 30 TABLET | Refills: 0 | Status: SHIPPED | OUTPATIENT
Start: 2021-11-14

## 2021-11-14 RX ORDER — FAMOTIDINE 10 MG/ML
20 INJECTION INTRAVENOUS
Status: COMPLETED | OUTPATIENT
Start: 2021-11-14 | End: 2021-11-14

## 2021-11-14 RX ORDER — ONDANSETRON 2 MG/ML
4 INJECTION INTRAMUSCULAR; INTRAVENOUS
Status: COMPLETED | OUTPATIENT
Start: 2021-11-14 | End: 2021-11-14

## 2021-11-14 RX ADMIN — ONDANSETRON 4 MG: 2 INJECTION INTRAMUSCULAR; INTRAVENOUS at 11:11

## 2021-11-14 RX ADMIN — FAMOTIDINE 20 MG: 10 INJECTION INTRAVENOUS at 11:11

## 2021-11-14 RX ADMIN — POTASSIUM CHLORIDE 40 MEQ: 1500 TABLET, EXTENDED RELEASE ORAL at 12:11

## 2021-11-14 RX ADMIN — SODIUM CHLORIDE 1000 ML: 0.9 INJECTION, SOLUTION INTRAVENOUS at 11:11

## 2021-11-15 LAB
HCV AB SERPL QL IA: NEGATIVE
HIV 1+2 AB+HIV1 P24 AG SERPL QL IA: NEGATIVE

## 2022-04-07 ENCOUNTER — OFFICE VISIT (OUTPATIENT)
Dept: URGENT CARE | Facility: CLINIC | Age: 47
End: 2022-04-07
Payer: MEDICAID

## 2022-04-07 VITALS
TEMPERATURE: 98 F | RESPIRATION RATE: 16 BRPM | HEART RATE: 71 BPM | WEIGHT: 179 LBS | BODY MASS INDEX: 27.13 KG/M2 | OXYGEN SATURATION: 98 % | DIASTOLIC BLOOD PRESSURE: 79 MMHG | SYSTOLIC BLOOD PRESSURE: 113 MMHG | HEIGHT: 68 IN

## 2022-04-07 DIAGNOSIS — H60.02 ABSCESS OF LEFT EXTERNAL EAR: Primary | ICD-10-CM

## 2022-04-07 PROCEDURE — 3008F BODY MASS INDEX DOCD: CPT | Mod: CPTII,S$GLB,, | Performed by: PHYSICIAN ASSISTANT

## 2022-04-07 PROCEDURE — 99213 OFFICE O/P EST LOW 20 MIN: CPT | Mod: 25,S$GLB,, | Performed by: PHYSICIAN ASSISTANT

## 2022-04-07 PROCEDURE — 1160F PR REVIEW ALL MEDS BY PRESCRIBER/CLIN PHARMACIST DOCUMENTED: ICD-10-PCS | Mod: CPTII,S$GLB,, | Performed by: PHYSICIAN ASSISTANT

## 2022-04-07 PROCEDURE — 69005 DRG XTRNL EAR ABSC/HEM COMP: CPT | Mod: LT,S$GLB,, | Performed by: PHYSICIAN ASSISTANT

## 2022-04-07 PROCEDURE — 3078F DIAST BP <80 MM HG: CPT | Mod: CPTII,S$GLB,, | Performed by: PHYSICIAN ASSISTANT

## 2022-04-07 PROCEDURE — 1160F RVW MEDS BY RX/DR IN RCRD: CPT | Mod: CPTII,S$GLB,, | Performed by: PHYSICIAN ASSISTANT

## 2022-04-07 PROCEDURE — 99213 PR OFFICE/OUTPT VISIT, EST, LEVL III, 20-29 MIN: ICD-10-PCS | Mod: 25,S$GLB,, | Performed by: PHYSICIAN ASSISTANT

## 2022-04-07 PROCEDURE — 3008F PR BODY MASS INDEX (BMI) DOCUMENTED: ICD-10-PCS | Mod: CPTII,S$GLB,, | Performed by: PHYSICIAN ASSISTANT

## 2022-04-07 PROCEDURE — 69005 INCISION & DRAINAGE: ICD-10-PCS | Mod: LT,S$GLB,, | Performed by: PHYSICIAN ASSISTANT

## 2022-04-07 PROCEDURE — 3074F PR MOST RECENT SYSTOLIC BLOOD PRESSURE < 130 MM HG: ICD-10-PCS | Mod: CPTII,S$GLB,, | Performed by: PHYSICIAN ASSISTANT

## 2022-04-07 PROCEDURE — 1159F MED LIST DOCD IN RCRD: CPT | Mod: CPTII,S$GLB,, | Performed by: PHYSICIAN ASSISTANT

## 2022-04-07 PROCEDURE — 1159F PR MEDICATION LIST DOCUMENTED IN MEDICAL RECORD: ICD-10-PCS | Mod: CPTII,S$GLB,, | Performed by: PHYSICIAN ASSISTANT

## 2022-04-07 PROCEDURE — 3078F PR MOST RECENT DIASTOLIC BLOOD PRESSURE < 80 MM HG: ICD-10-PCS | Mod: CPTII,S$GLB,, | Performed by: PHYSICIAN ASSISTANT

## 2022-04-07 PROCEDURE — 3074F SYST BP LT 130 MM HG: CPT | Mod: CPTII,S$GLB,, | Performed by: PHYSICIAN ASSISTANT

## 2022-04-07 RX ORDER — MUPIROCIN 20 MG/G
OINTMENT TOPICAL
Qty: 22 G | Refills: 1 | Status: ON HOLD | OUTPATIENT
Start: 2022-04-07 | End: 2023-06-12 | Stop reason: HOSPADM

## 2022-04-07 RX ORDER — SULFAMETHOXAZOLE AND TRIMETHOPRIM 800; 160 MG/1; MG/1
1 TABLET ORAL 2 TIMES DAILY
Qty: 20 TABLET | Refills: 0 | Status: SHIPPED | OUTPATIENT
Start: 2022-04-07 | End: 2022-04-17

## 2022-04-07 NOTE — PROCEDURES
Incision & Drainage    Date/Time: 4/7/2022 8:15 AM  Performed by: Sondra Curiel PA-C  Authorized by: Sondra Curiel PA-C     Consent Done?:  Yes (Verbal)    Type:  Abscess  Body area:  Head/neck  Location details:  Left external ear  Anesthesia:  Local infiltration  Local anesthetic: lidocaine 1% without epinephrine  Scalpel size:  11  Incision type:  Single straight  Incision depth: subcutaneous    Complexity:  Complex  Drainage:  Pus and purulent  Drainage amount:  Moderate  Wound treatment:  Incision and drainage  Packing material:  None  Patient tolerance:  Patient tolerated the procedure well with no immediate complications

## 2022-04-07 NOTE — PATIENT INSTRUCTIONS
- Rest.  - Drink plenty of fluids.  - Take Tylenol and/or Ibuprofen as directed as needed for fever/pain.  Do not take more than the recommended dose.  - follow up with your PCP within the next 1-2 weeks as needed.   - You must understand that you have received an Urgent Care treatment only and that you may be released before all of your medical problems are known or treated.   - You, the patient, will arrange for follow up care as instructed.   - If your condition worsens or fails to improve we recommend that you receive another evaluation at the ER immediately or contact your PCP to discuss your concerns.   - You can call [phone] or [phone] to help schedule an appointment with the appropriate provider.

## 2022-04-07 NOTE — PROGRESS NOTES
"Subjective:       Patient ID: Andrea Shields is a 46 y.o. male.    Vitals:  height is 5' 8" (1.727 m) and weight is 81.2 kg (179 lb). His temporal temperature is 97.7 °F (36.5 °C). His blood pressure is 113/79 and his pulse is 71. His respiration is 16 and oxygen saturation is 98%.     Chief Complaint: Otalgia    Patient is here for left ear external abscess.  First noticed some swelling and discomfort approximately 1 week ago.  He says the area steadily gotten larger.  It is painful.  He denies any drainage from the area.  Patient states that he has tried to squeeze it but has got no discharge from it.  He denies fever or chills.    Otalgia   There is pain in the left ear. This is a new problem. Episode onset: one week ago. The problem occurs constantly. The problem has been gradually worsening. There has been no fever. The pain is at a severity of 8/10. The pain is moderate. Pertinent negatives include no headaches. He has tried acetaminophen for the symptoms. The treatment provided no relief. There is no history of a chronic ear infection, hearing loss or a tympanostomy tube.       Constitution: Negative for chills and fever.   HENT: Positive for ear pain.    Cardiovascular: Negative for chest pain.   Respiratory: Negative for shortness of breath.    Musculoskeletal: Negative for pain and trauma.   Skin: Positive for abscess.   Neurological: Negative for headaches and loss of consciousness.   Psychiatric/Behavioral: Negative for nervous/anxious. The patient is not nervous/anxious.        Objective:      Physical Exam   Constitutional: He is oriented to person, place, and time. He appears well-developed. He is cooperative.  Non-toxic appearance. He does not appear ill. No distress.   HENT:   Head: Normocephalic and atraumatic.   Ears:   Right Ear: Hearing, tympanic membrane, external ear and ear canal normal.   Left Ear: Hearing, tympanic membrane and ear canal normal.   Ears:    Eyes: Conjunctivae and lids are " normal. No scleral icterus.   Neck: Trachea normal and phonation normal. Neck supple. No edema present. No erythema present. No neck rigidity present.   Cardiovascular: Normal rate, regular rhythm, normal heart sounds and normal pulses.   Pulmonary/Chest: Effort normal and breath sounds normal. No respiratory distress. He has no decreased breath sounds. He has no rhonchi.   Abdominal: Normal appearance.   Musculoskeletal: Normal range of motion.         General: No tenderness or deformity. Normal range of motion.   Neurological: He is alert and oriented to person, place, and time. He exhibits normal muscle tone. Coordination normal.   Skin: Skin is warm, dry, intact, not diaphoretic, not pale and no rash.   Psychiatric: His speech is normal and behavior is normal. Judgment and thought content normal.   Nursing note and vitals reviewed.      Assessment:       1. Abscess of left external ear          Plan:         Abscess of left external ear  -     mupirocin (BACTROBAN) 2 % ointment; Apply to affected area 3 times daily  Dispense: 22 g; Refill: 1  -     sulfamethoxazole-trimethoprim 800-160mg (BACTRIM DS) 800-160 mg Tab; Take 1 tablet by mouth 2 (two) times daily. for 10 days  Dispense: 20 tablet; Refill: 0  -     Incision & Drainage                 Patient Instructions   - Rest.  - Drink plenty of fluids.  - Take Tylenol and/or Ibuprofen as directed as needed for fever/pain.  Do not take more than the recommended dose.  - follow up with your PCP within the next 1-2 weeks as needed.   - You must understand that you have received an Urgent Care treatment only and that you may be released before all of your medical problems are known or treated.   - You, the patient, will arrange for follow up care as instructed.   - If your condition worsens or fails to improve we recommend that you receive another evaluation at the ER immediately or contact your PCP to discuss your concerns.   - You can call [phone] or [phone] to help schedule an appointment with the appropriate provider.

## 2023-01-21 ENCOUNTER — OFFICE VISIT (OUTPATIENT)
Dept: URGENT CARE | Facility: CLINIC | Age: 48
End: 2023-01-21
Payer: MEDICAID

## 2023-01-21 VITALS
TEMPERATURE: 98 F | OXYGEN SATURATION: 96 % | WEIGHT: 179 LBS | HEART RATE: 98 BPM | DIASTOLIC BLOOD PRESSURE: 95 MMHG | HEIGHT: 68 IN | BODY MASS INDEX: 27.13 KG/M2 | SYSTOLIC BLOOD PRESSURE: 138 MMHG

## 2023-01-21 DIAGNOSIS — R06.2 WHEEZING: ICD-10-CM

## 2023-01-21 DIAGNOSIS — R05.9 COUGH, UNSPECIFIED TYPE: Primary | ICD-10-CM

## 2023-01-21 LAB
CTP QC/QA: YES
SARS-COV-2 AG RESP QL IA.RAPID: NEGATIVE

## 2023-01-21 PROCEDURE — 1159F MED LIST DOCD IN RCRD: CPT | Mod: CPTII,S$GLB,,

## 2023-01-21 PROCEDURE — 99214 PR OFFICE/OUTPT VISIT, EST, LEVL IV, 30-39 MIN: ICD-10-PCS | Mod: 25,S$GLB,,

## 2023-01-21 PROCEDURE — 99214 OFFICE O/P EST MOD 30 MIN: CPT | Mod: 25,S$GLB,,

## 2023-01-21 PROCEDURE — 71046 XR CHEST PA AND LATERAL: ICD-10-PCS | Mod: S$GLB,,, | Performed by: RADIOLOGY

## 2023-01-21 PROCEDURE — 1160F RVW MEDS BY RX/DR IN RCRD: CPT | Mod: CPTII,S$GLB,,

## 2023-01-21 PROCEDURE — 71046 X-RAY EXAM CHEST 2 VIEWS: CPT | Mod: S$GLB,,, | Performed by: RADIOLOGY

## 2023-01-21 PROCEDURE — 1159F PR MEDICATION LIST DOCUMENTED IN MEDICAL RECORD: ICD-10-PCS | Mod: CPTII,S$GLB,,

## 2023-01-21 PROCEDURE — 3075F SYST BP GE 130 - 139MM HG: CPT | Mod: CPTII,S$GLB,,

## 2023-01-21 PROCEDURE — 3080F PR MOST RECENT DIASTOLIC BLOOD PRESSURE >= 90 MM HG: ICD-10-PCS | Mod: CPTII,S$GLB,,

## 2023-01-21 PROCEDURE — 3008F BODY MASS INDEX DOCD: CPT | Mod: CPTII,S$GLB,,

## 2023-01-21 PROCEDURE — 3080F DIAST BP >= 90 MM HG: CPT | Mod: CPTII,S$GLB,,

## 2023-01-21 PROCEDURE — 94640 AIRWAY INHALATION TREATMENT: CPT | Mod: S$GLB,,, | Performed by: FAMILY MEDICINE

## 2023-01-21 PROCEDURE — 3075F PR MOST RECENT SYSTOLIC BLOOD PRESS GE 130-139MM HG: ICD-10-PCS | Mod: CPTII,S$GLB,,

## 2023-01-21 PROCEDURE — 87811 SARS CORONAVIRUS 2 ANTIGEN POCT, MANUAL READ: ICD-10-PCS | Mod: QW,S$GLB,,

## 2023-01-21 PROCEDURE — 3008F PR BODY MASS INDEX (BMI) DOCUMENTED: ICD-10-PCS | Mod: CPTII,S$GLB,,

## 2023-01-21 PROCEDURE — 87811 SARS-COV-2 COVID19 W/OPTIC: CPT | Mod: QW,S$GLB,,

## 2023-01-21 PROCEDURE — 1160F PR REVIEW ALL MEDS BY PRESCRIBER/CLIN PHARMACIST DOCUMENTED: ICD-10-PCS | Mod: CPTII,S$GLB,,

## 2023-01-21 PROCEDURE — 94640 PR INHAL RX, AIRWAY OBST/DX SPUTUM INDUCT: ICD-10-PCS | Mod: S$GLB,,, | Performed by: FAMILY MEDICINE

## 2023-01-21 RX ORDER — IPRATROPIUM BROMIDE 0.5 MG/2.5ML
0.5 SOLUTION RESPIRATORY (INHALATION)
Status: COMPLETED | OUTPATIENT
Start: 2023-01-21 | End: 2023-01-21

## 2023-01-21 RX ORDER — DEXAMETHASONE SODIUM PHOSPHATE 100 MG/10ML
10 INJECTION INTRAMUSCULAR; INTRAVENOUS
Status: DISCONTINUED | OUTPATIENT
Start: 2023-01-21 | End: 2023-01-21

## 2023-01-21 RX ORDER — ALBUTEROL SULFATE 0.83 MG/ML
2.5 SOLUTION RESPIRATORY (INHALATION)
Status: COMPLETED | OUTPATIENT
Start: 2023-01-21 | End: 2023-01-21

## 2023-01-21 RX ORDER — IPRATROPIUM BROMIDE AND ALBUTEROL SULFATE 2.5; .5 MG/3ML; MG/3ML
3 SOLUTION RESPIRATORY (INHALATION) EVERY 6 HOURS PRN
Qty: 75 ML | Refills: 0 | Status: SHIPPED | OUTPATIENT
Start: 2023-01-21 | End: 2024-01-21

## 2023-01-21 RX ORDER — PREDNISONE 20 MG/1
40 TABLET ORAL DAILY
Qty: 10 TABLET | Refills: 0 | Status: SHIPPED | OUTPATIENT
Start: 2023-01-21 | End: 2023-01-26

## 2023-01-21 RX ORDER — BETAMETHASONE SODIUM PHOSPHATE AND BETAMETHASONE ACETATE 3; 3 MG/ML; MG/ML
6 INJECTION, SUSPENSION INTRA-ARTICULAR; INTRALESIONAL; INTRAMUSCULAR; SOFT TISSUE
Status: COMPLETED | OUTPATIENT
Start: 2023-01-21 | End: 2023-01-21

## 2023-01-21 RX ADMIN — IPRATROPIUM BROMIDE 0.5 MG: 0.5 SOLUTION RESPIRATORY (INHALATION) at 03:01

## 2023-01-21 RX ADMIN — ALBUTEROL SULFATE 2.5 MG: 0.83 SOLUTION RESPIRATORY (INHALATION) at 03:01

## 2023-01-21 RX ADMIN — BETAMETHASONE SODIUM PHOSPHATE AND BETAMETHASONE ACETATE 6 MG: 3; 3 INJECTION, SUSPENSION INTRA-ARTICULAR; INTRALESIONAL; INTRAMUSCULAR; SOFT TISSUE at 04:01

## 2023-01-21 NOTE — PROGRESS NOTES
"Subjective:       Patient ID: Andrea Shields is a 47 y.o. male.    Vitals:  height is 5' 8" (1.727 m) and weight is 81.2 kg (179 lb). His oral temperature is 98.2 °F (36.8 °C). His blood pressure is 138/95 (abnormal) and his pulse is 98. His oxygen saturation is 96%.     Chief Complaint: Cough    47-year-old male with no significant past medical history presents with complaints of cough and cold-like symptoms that started about 2-3 days ago.  He was taking Rosalina-[place] cold and Sinus which did help him cough up the chest congestion.  He was coughing up yellow mucus sometimes clear mucus.  He states that last night this morning he began having wheezing.  No chest pain or shortness of breath.  He does have a childhood history of asthma but has not used any albuterol inhalers recently and has not had adult asthma flare-ups recently.    Cough  This is a new problem. Episode onset: 2 days ago. The problem has been unchanged. The problem occurs constantly. The cough is Non-productive. Associated symptoms include wheezing. Pertinent negatives include no chills, fever or shortness of breath. Associated symptoms comments: Chest congestion   . Nothing aggravates the symptoms. He has tried OTC cough suppressant for the symptoms. The treatment provided no relief.     Constitution: Negative for chills, sweating, fatigue and fever.   Respiratory:  Positive for cough and wheezing. Negative for sputum production and shortness of breath.      Objective:      Physical Exam   Constitutional: He is oriented to person, place, and time. He appears well-developed. He is cooperative.  Non-toxic appearance. He does not appear ill. No distress.   HENT:   Head: Normocephalic and atraumatic.   Ears:   Right Ear: Hearing, tympanic membrane, external ear and ear canal normal.   Left Ear: Hearing, tympanic membrane, external ear and ear canal normal.   Nose: Nose normal. No mucosal edema, rhinorrhea or nasal deformity. No epistaxis. Right sinus " exhibits no maxillary sinus tenderness and no frontal sinus tenderness. Left sinus exhibits no maxillary sinus tenderness and no frontal sinus tenderness.   Mouth/Throat: Uvula is midline, oropharynx is clear and moist and mucous membranes are normal. No trismus in the jaw. Normal dentition. No uvula swelling. No posterior oropharyngeal erythema.   Eyes: Conjunctivae, EOM and lids are normal. Pupils are equal, round, and reactive to light. Right eye exhibits no discharge. Left eye exhibits no discharge. No scleral icterus.   Neck: Trachea normal and phonation normal. Neck supple.   Cardiovascular: Normal rate, regular rhythm, normal heart sounds and normal pulses.   Pulmonary/Chest: Effort normal. No respiratory distress. He has wheezes (Diffuse). He has no rhonchi. He has no rales. He exhibits no tenderness.   Abdominal: Normal appearance and bowel sounds are normal. He exhibits no distension and no mass. Soft. There is no abdominal tenderness.   Musculoskeletal: Normal range of motion.         General: No deformity. Normal range of motion.   Neurological: He is alert and oriented to person, place, and time. He exhibits normal muscle tone. Coordination normal.   Skin: Skin is warm, dry, intact, not diaphoretic and not pale.   Psychiatric: His speech is normal and behavior is normal. Judgment and thought content normal.   Nursing note and vitals reviewed.      XR CHEST PA AND LATERAL    Result Date: 1/21/2023  EXAMINATION: XR CHEST PA AND LATERAL CLINICAL HISTORY: Wheezing TECHNIQUE: PA and lateral views of the chest were performed. COMPARISON: Prior dated 02/03/2021 FINDINGS: The mediastinal structures are midline.  The cardiac silhouette is not enlarged.  There is no evidence of acute pulmonary disease, pleural disease, lymph node enlargement, or cardiac decompensation.  No osseous abnormalities are identified.     Normal chest radiograph. Electronically signed by: Kylah Rogers MD Date:    01/21/2023  Time:    15:40     Assessment:       1. Cough, unspecified type    2. Wheezing          Plan:       47-year-old male with no significant past medical history of than childhood asthma presents with complaints of wheezing that began last night following cold-like symptoms for the past 2-3 days.  He has been taking Rosalina-Bard cold and Sinus which has helped cough.  Differential includes: Bronchitis, asthma exacerbation, URI, pneumonia.  Chest x-ray was ordered to rule out pneumonia, no pleural effusion or pulmonary consolidation on x-ray.  Personally reviewed finalized radiology report which did not show any acute pulmonary consolidation.  Patient was given a DuoNeb treatment in clinic which improved his wheezing.  Patient was also given a steroid injection for wheezing and bronchospasm.  He is in no acute respiratory distress.  Patient given oral prednisone to begin taking tomorrow as well as continued the use of Rosalina-Bard cold and Sinus.  Return precautions discussed patient has no further questions at this time.  Cough, unspecified type  -     SARS Coronavirus 2 Antigen, POCT Manual Read    Wheezing  -     ipratropium 0.02 % nebulizer solution 0.5 mg  -     albuterol nebulizer solution 2.5 mg  -     XR CHEST PA AND LATERAL; Future; Expected date: 01/21/2023  -     Discontinue: dexAMETHasone injection 10 mg  -     predniSONE (DELTASONE) 20 MG tablet; Take 2 tablets (40 mg total) by mouth once daily. for 5 days  Dispense: 10 tablet; Refill: 0  -     albuterol-ipratropium (DUO-NEB) 2.5 mg-0.5 mg/3 mL nebulizer solution; Take 3 mLs by nebulization every 6 (six) hours as needed for Wheezing. Rescue  Dispense: 75 mL; Refill: 0    Other orders  -     betamethasone acetate-betamethasone sodium phosphate injection 6 mg

## 2023-06-10 ENCOUNTER — HOSPITAL ENCOUNTER (INPATIENT)
Facility: HOSPITAL | Age: 48
LOS: 2 days | Discharge: HOME | DRG: 189 | End: 2023-06-12
Attending: EMERGENCY MEDICINE | Admitting: INTERNAL MEDICINE
Payer: MEDICAID

## 2023-06-10 DIAGNOSIS — R06.02 SHORTNESS OF BREATH: ICD-10-CM

## 2023-06-10 DIAGNOSIS — J96.02 ACUTE RESPIRATORY FAILURE WITH HYPOXIA AND HYPERCAPNIA: Primary | ICD-10-CM

## 2023-06-10 DIAGNOSIS — J45.51 SEVERE PERSISTENT ASTHMA WITH ACUTE EXACERBATION: ICD-10-CM

## 2023-06-10 DIAGNOSIS — J96.01 ACUTE RESPIRATORY FAILURE WITH HYPOXIA AND HYPERCAPNIA: Primary | ICD-10-CM

## 2023-06-10 DIAGNOSIS — R07.9 CHEST PAIN: ICD-10-CM

## 2023-06-10 LAB
ALBUMIN SERPL-MCNC: 3.8 G/DL (ref 3.3–5.5)
ALLENS TEST: ABNORMAL
ALP SERPL-CCNC: 89 U/L (ref 42–141)
BILIRUB SERPL-MCNC: 0.6 MG/DL (ref 0.2–1.6)
BUN SERPL-MCNC: 17 MG/DL (ref 7–22)
CALCIUM SERPL-MCNC: 10 MG/DL (ref 8–10.3)
CHLORIDE SERPL-SCNC: 104 MMOL/L (ref 98–108)
CREAT SERPL-MCNC: 0.9 MG/DL (ref 0.6–1.2)
CTP QC/QA: YES
GLUCOSE SERPL-MCNC: 96 MG/DL (ref 73–118)
HCO3 UR-SCNC: 30.5 MMOL/L (ref 24–28)
INFLUENZA A ANTIGEN, POC: NEGATIVE
INFLUENZA B ANTIGEN, POC: NEGATIVE
LDH SERPL L TO P-CCNC: 0.5 MMOL/L (ref 0.5–2.2)
PCO2 BLDA: 59.8 MMHG (ref 35–45)
PH SMN: 7.32 [PH] (ref 7.35–7.45)
PO2 BLDA: 39 MMHG (ref 40–60)
POC ALT (SGPT): 12 U/L (ref 10–47)
POC AST (SGOT): 21 U/L (ref 11–38)
POC BE: 3 MMOL/L
POC CARDIAC TROPONIN I: 0 NG/ML (ref 0–0.08)
POC D-DI: 211 NG/ML (ref 0–450)
POC SATURATED O2: 67 % (ref 95–100)
POC TCO2: 29 MMOL/L (ref 18–33)
POC TCO2: 32 MMOL/L (ref 24–29)
POTASSIUM BLD-SCNC: 4.7 MMOL/L (ref 3.6–5.1)
PROTEIN, POC: 8 G/DL (ref 6.4–8.1)
SAMPLE: ABNORMAL
SAMPLE: NORMAL
SARS-COV-2 RDRP RESP QL NAA+PROBE: NEGATIVE
SITE: ABNORMAL
SODIUM BLD-SCNC: 142 MMOL/L (ref 128–145)

## 2023-06-10 PROCEDURE — 96365 THER/PROPH/DIAG IV INF INIT: CPT | Mod: ER

## 2023-06-10 PROCEDURE — 84484 ASSAY OF TROPONIN QUANT: CPT | Mod: ER

## 2023-06-10 PROCEDURE — 94640 AIRWAY INHALATION TREATMENT: CPT | Mod: ER

## 2023-06-10 PROCEDURE — 87804 INFLUENZA ASSAY W/OPTIC: CPT | Mod: ER

## 2023-06-10 PROCEDURE — 85025 COMPLETE CBC W/AUTO DIFF WBC: CPT | Mod: ER

## 2023-06-10 PROCEDURE — 27000221 HC OXYGEN, UP TO 24 HOURS: Mod: ER

## 2023-06-10 PROCEDURE — 21400001 HC TELEMETRY ROOM

## 2023-06-10 PROCEDURE — 93010 ELECTROCARDIOGRAM REPORT: CPT | Mod: ,,, | Performed by: INTERNAL MEDICINE

## 2023-06-10 PROCEDURE — 85379 FIBRIN DEGRADATION QUANT: CPT | Mod: ER

## 2023-06-10 PROCEDURE — 80053 COMPREHEN METABOLIC PANEL: CPT | Mod: ER

## 2023-06-10 PROCEDURE — 96375 TX/PRO/DX INJ NEW DRUG ADDON: CPT | Mod: ER

## 2023-06-10 PROCEDURE — 82803 BLOOD GASES ANY COMBINATION: CPT | Mod: ER

## 2023-06-10 PROCEDURE — 99285 EMERGENCY DEPT VISIT HI MDM: CPT | Mod: 25,ER

## 2023-06-10 PROCEDURE — 25000242 PHARM REV CODE 250 ALT 637 W/ HCPCS: Mod: ER | Performed by: EMERGENCY MEDICINE

## 2023-06-10 PROCEDURE — 93005 ELECTROCARDIOGRAM TRACING: CPT | Mod: ER

## 2023-06-10 PROCEDURE — 63600175 PHARM REV CODE 636 W HCPCS: Mod: ER | Performed by: EMERGENCY MEDICINE

## 2023-06-10 PROCEDURE — 93010 EKG 12-LEAD: ICD-10-PCS | Mod: ,,, | Performed by: INTERNAL MEDICINE

## 2023-06-10 RX ORDER — METHYLPREDNISOLONE SOD SUCC 125 MG
125 VIAL (EA) INJECTION
Status: DISCONTINUED | OUTPATIENT
Start: 2023-06-10 | End: 2023-06-10

## 2023-06-10 RX ORDER — IPRATROPIUM BROMIDE AND ALBUTEROL SULFATE 2.5; .5 MG/3ML; MG/3ML
3 SOLUTION RESPIRATORY (INHALATION) ONCE
Status: DISCONTINUED | OUTPATIENT
Start: 2023-06-11 | End: 2023-06-11

## 2023-06-10 RX ORDER — ALBUTEROL SULFATE 2.5 MG/.5ML
5 SOLUTION RESPIRATORY (INHALATION)
Status: COMPLETED | OUTPATIENT
Start: 2023-06-10 | End: 2023-06-10

## 2023-06-10 RX ORDER — MAGNESIUM SULFATE HEPTAHYDRATE 40 MG/ML
2 INJECTION, SOLUTION INTRAVENOUS ONCE
Status: COMPLETED | OUTPATIENT
Start: 2023-06-10 | End: 2023-06-10

## 2023-06-10 RX ORDER — METHYLPREDNISOLONE SOD SUCC 125 MG
125 VIAL (EA) INJECTION
Status: COMPLETED | OUTPATIENT
Start: 2023-06-10 | End: 2023-06-10

## 2023-06-10 RX ADMIN — ALBUTEROL SULFATE 5 MG: 2.5 SOLUTION RESPIRATORY (INHALATION) at 08:06

## 2023-06-10 RX ADMIN — MAGNESIUM SULFATE 2 G: 2 INJECTION INTRAVENOUS at 09:06

## 2023-06-10 RX ADMIN — METHYLPREDNISOLONE SODIUM SUCCINATE 125 MG: 125 INJECTION, POWDER, FOR SOLUTION INTRAMUSCULAR; INTRAVENOUS at 08:06

## 2023-06-11 PROBLEM — J45.50 SEVERE PERSISTENT ASTHMA: Status: ACTIVE | Noted: 2023-06-11

## 2023-06-11 LAB
ALBUMIN SERPL BCP-MCNC: 4 G/DL (ref 3.5–5.2)
ALP SERPL-CCNC: 90 U/L (ref 55–135)
ALT SERPL W/O P-5'-P-CCNC: 12 U/L (ref 10–44)
ANION GAP SERPL CALC-SCNC: 10 MMOL/L (ref 8–16)
AST SERPL-CCNC: 12 U/L (ref 10–40)
BASOPHILS # BLD AUTO: 0.01 K/UL (ref 0–0.2)
BASOPHILS NFR BLD: 0.2 % (ref 0–1.9)
BILIRUB SERPL-MCNC: 0.4 MG/DL (ref 0.1–1)
BUN SERPL-MCNC: 14 MG/DL (ref 6–20)
CALCIUM SERPL-MCNC: 9.6 MG/DL (ref 8.7–10.5)
CHLORIDE SERPL-SCNC: 101 MMOL/L (ref 95–110)
CO2 SERPL-SCNC: 26 MMOL/L (ref 23–29)
CREAT SERPL-MCNC: 0.8 MG/DL (ref 0.5–1.4)
DIFFERENTIAL METHOD: ABNORMAL
EOSINOPHIL # BLD AUTO: 0 K/UL (ref 0–0.5)
EOSINOPHIL NFR BLD: 0.2 % (ref 0–8)
ERYTHROCYTE [DISTWIDTH] IN BLOOD BY AUTOMATED COUNT: 13.7 % (ref 11.5–14.5)
EST. GFR  (NO RACE VARIABLE): >60 ML/MIN/1.73 M^2
GLUCOSE SERPL-MCNC: 126 MG/DL (ref 70–110)
HCT VFR BLD AUTO: 44.4 % (ref 40–54)
HGB BLD-MCNC: 13.7 G/DL (ref 14–18)
IMM GRANULOCYTES # BLD AUTO: 0.02 K/UL (ref 0–0.04)
IMM GRANULOCYTES NFR BLD AUTO: 0.3 % (ref 0–0.5)
LYMPHOCYTES # BLD AUTO: 1 K/UL (ref 1–4.8)
LYMPHOCYTES NFR BLD: 15.9 % (ref 18–48)
MAGNESIUM SERPL-MCNC: 2.2 MG/DL (ref 1.6–2.6)
MCH RBC QN AUTO: 29.3 PG (ref 27–31)
MCHC RBC AUTO-ENTMCNC: 30.9 G/DL (ref 32–36)
MCV RBC AUTO: 95 FL (ref 82–98)
MONOCYTES # BLD AUTO: 0.1 K/UL (ref 0.3–1)
MONOCYTES NFR BLD: 1.7 % (ref 4–15)
NEUTROPHILS # BLD AUTO: 5.3 K/UL (ref 1.8–7.7)
NEUTROPHILS NFR BLD: 81.7 % (ref 38–73)
NRBC BLD-RTO: 0 /100 WBC
PHOSPHATE SERPL-MCNC: 3.9 MG/DL (ref 2.7–4.5)
PLATELET # BLD AUTO: 259 K/UL (ref 150–450)
PMV BLD AUTO: 10.4 FL (ref 9.2–12.9)
POTASSIUM SERPL-SCNC: 4.8 MMOL/L (ref 3.5–5.1)
PROT SERPL-MCNC: 7.9 G/DL (ref 6–8.4)
RBC # BLD AUTO: 4.68 M/UL (ref 4.6–6.2)
SODIUM SERPL-SCNC: 137 MMOL/L (ref 136–145)
WBC # BLD AUTO: 6.49 K/UL (ref 3.9–12.7)

## 2023-06-11 PROCEDURE — 25000242 PHARM REV CODE 250 ALT 637 W/ HCPCS: Performed by: EMERGENCY MEDICINE

## 2023-06-11 PROCEDURE — 27100107 HC POCKET PEAK FLOW METER

## 2023-06-11 PROCEDURE — 25000003 PHARM REV CODE 250: Performed by: INTERNAL MEDICINE

## 2023-06-11 PROCEDURE — 36415 COLL VENOUS BLD VENIPUNCTURE: CPT | Performed by: STUDENT IN AN ORGANIZED HEALTH CARE EDUCATION/TRAINING PROGRAM

## 2023-06-11 PROCEDURE — 27000221 HC OXYGEN, UP TO 24 HOURS

## 2023-06-11 PROCEDURE — 94799 UNLISTED PULMONARY SVC/PX: CPT

## 2023-06-11 PROCEDURE — 80053 COMPREHEN METABOLIC PANEL: CPT | Performed by: STUDENT IN AN ORGANIZED HEALTH CARE EDUCATION/TRAINING PROGRAM

## 2023-06-11 PROCEDURE — 21400001 HC TELEMETRY ROOM

## 2023-06-11 PROCEDURE — 25000242 PHARM REV CODE 250 ALT 637 W/ HCPCS: Performed by: INTERNAL MEDICINE

## 2023-06-11 PROCEDURE — 99900035 HC TECH TIME PER 15 MIN (STAT)

## 2023-06-11 PROCEDURE — 84100 ASSAY OF PHOSPHORUS: CPT | Performed by: STUDENT IN AN ORGANIZED HEALTH CARE EDUCATION/TRAINING PROGRAM

## 2023-06-11 PROCEDURE — 83735 ASSAY OF MAGNESIUM: CPT | Performed by: STUDENT IN AN ORGANIZED HEALTH CARE EDUCATION/TRAINING PROGRAM

## 2023-06-11 PROCEDURE — 85025 COMPLETE CBC W/AUTO DIFF WBC: CPT | Performed by: STUDENT IN AN ORGANIZED HEALTH CARE EDUCATION/TRAINING PROGRAM

## 2023-06-11 PROCEDURE — 25000003 PHARM REV CODE 250: Performed by: NURSE PRACTITIONER

## 2023-06-11 PROCEDURE — 94761 N-INVAS EAR/PLS OXIMETRY MLT: CPT

## 2023-06-11 PROCEDURE — 63600175 PHARM REV CODE 636 W HCPCS: Performed by: INTERNAL MEDICINE

## 2023-06-11 PROCEDURE — 94640 AIRWAY INHALATION TREATMENT: CPT

## 2023-06-11 RX ORDER — DEXAMETHASONE SODIUM PHOSPHATE 100 MG/10ML
10 INJECTION INTRAMUSCULAR; INTRAVENOUS EVERY 6 HOURS
Status: DISCONTINUED | OUTPATIENT
Start: 2023-06-11 | End: 2023-06-12 | Stop reason: HOSPADM

## 2023-06-11 RX ORDER — IPRATROPIUM BROMIDE AND ALBUTEROL SULFATE 2.5; .5 MG/3ML; MG/3ML
3 SOLUTION RESPIRATORY (INHALATION) EVERY 4 HOURS
Status: DISCONTINUED | OUTPATIENT
Start: 2023-06-11 | End: 2023-06-12 | Stop reason: HOSPADM

## 2023-06-11 RX ORDER — DEXAMETHASONE SODIUM PHOSPHATE 100 MG/10ML
5 INJECTION INTRAMUSCULAR; INTRAVENOUS EVERY 6 HOURS
Status: DISCONTINUED | OUTPATIENT
Start: 2023-06-11 | End: 2023-06-11

## 2023-06-11 RX ORDER — ONDANSETRON 2 MG/ML
4 INJECTION INTRAMUSCULAR; INTRAVENOUS EVERY 8 HOURS PRN
Status: DISCONTINUED | OUTPATIENT
Start: 2023-06-11 | End: 2023-06-12 | Stop reason: HOSPADM

## 2023-06-11 RX ORDER — SODIUM CHLORIDE 0.9 % (FLUSH) 0.9 %
3 SYRINGE (ML) INJECTION
Status: DISCONTINUED | OUTPATIENT
Start: 2023-06-11 | End: 2023-06-12 | Stop reason: HOSPADM

## 2023-06-11 RX ORDER — ENOXAPARIN SODIUM 100 MG/ML
40 INJECTION SUBCUTANEOUS EVERY 24 HOURS
Status: DISCONTINUED | OUTPATIENT
Start: 2023-06-11 | End: 2023-06-12 | Stop reason: HOSPADM

## 2023-06-11 RX ORDER — ALBUTEROL SULFATE 2.5 MG/.5ML
2.5 SOLUTION RESPIRATORY (INHALATION) EVERY 4 HOURS
Status: DISCONTINUED | OUTPATIENT
Start: 2023-06-11 | End: 2023-06-11

## 2023-06-11 RX ORDER — MONTELUKAST SODIUM 10 MG/1
10 TABLET ORAL DAILY
Status: DISCONTINUED | OUTPATIENT
Start: 2023-06-11 | End: 2023-06-12 | Stop reason: HOSPADM

## 2023-06-11 RX ORDER — IPRATROPIUM BROMIDE AND ALBUTEROL SULFATE 2.5; .5 MG/3ML; MG/3ML
3 SOLUTION RESPIRATORY (INHALATION) EVERY 4 HOURS PRN
Status: DISCONTINUED | OUTPATIENT
Start: 2023-06-11 | End: 2023-06-12

## 2023-06-11 RX ORDER — BUTALBITAL, ACETAMINOPHEN AND CAFFEINE 50; 325; 40 MG/1; MG/1; MG/1
1 TABLET ORAL EVERY 4 HOURS PRN
Status: DISCONTINUED | OUTPATIENT
Start: 2023-06-11 | End: 2023-06-12 | Stop reason: HOSPADM

## 2023-06-11 RX ORDER — TALC
6 POWDER (GRAM) TOPICAL NIGHTLY PRN
Status: DISCONTINUED | OUTPATIENT
Start: 2023-06-11 | End: 2023-06-12 | Stop reason: HOSPADM

## 2023-06-11 RX ORDER — SODIUM CHLORIDE 0.9 % (FLUSH) 0.9 %
10 SYRINGE (ML) INJECTION
Status: DISCONTINUED | OUTPATIENT
Start: 2023-06-11 | End: 2023-06-12 | Stop reason: HOSPADM

## 2023-06-11 RX ORDER — ACETAMINOPHEN 325 MG/1
650 TABLET ORAL EVERY 4 HOURS PRN
Status: DISCONTINUED | OUTPATIENT
Start: 2023-06-11 | End: 2023-06-12 | Stop reason: HOSPADM

## 2023-06-11 RX ORDER — IPRATROPIUM BROMIDE 0.5 MG/2.5ML
0.5 SOLUTION RESPIRATORY (INHALATION) EVERY 4 HOURS
Status: DISCONTINUED | OUTPATIENT
Start: 2023-06-11 | End: 2023-06-11

## 2023-06-11 RX ORDER — PANTOPRAZOLE SODIUM 40 MG/1
40 TABLET, DELAYED RELEASE ORAL DAILY
Status: DISCONTINUED | OUTPATIENT
Start: 2023-06-11 | End: 2023-06-12 | Stop reason: HOSPADM

## 2023-06-11 RX ADMIN — BUTALBITAL, ACETAMINOPHEN, AND CAFFEINE 1 TABLET: 50; 325; 40 TABLET ORAL at 09:06

## 2023-06-11 RX ADMIN — DEXAMETHASONE SODIUM PHOSPHATE 10 MG: 10 INJECTION INTRAMUSCULAR; INTRAVENOUS at 12:06

## 2023-06-11 RX ADMIN — PANTOPRAZOLE SODIUM 40 MG: 40 TABLET, DELAYED RELEASE ORAL at 08:06

## 2023-06-11 RX ADMIN — ACETAMINOPHEN 650 MG: 325 TABLET ORAL at 12:06

## 2023-06-11 RX ADMIN — IPRATROPIUM BROMIDE AND ALBUTEROL SULFATE 3 ML: .5; 3 SOLUTION RESPIRATORY (INHALATION) at 04:06

## 2023-06-11 RX ADMIN — IPRATROPIUM BROMIDE AND ALBUTEROL SULFATE 3 ML: .5; 3 SOLUTION RESPIRATORY (INHALATION) at 07:06

## 2023-06-11 RX ADMIN — DEXAMETHASONE SODIUM PHOSPHATE 10 MG: 10 INJECTION INTRAMUSCULAR; INTRAVENOUS at 05:06

## 2023-06-11 RX ADMIN — MONTELUKAST 10 MG: 10 TABLET, FILM COATED ORAL at 08:06

## 2023-06-11 RX ADMIN — Medication 6 MG: at 09:06

## 2023-06-11 RX ADMIN — IPRATROPIUM BROMIDE AND ALBUTEROL SULFATE 3 ML: .5; 3 SOLUTION RESPIRATORY (INHALATION) at 12:06

## 2023-06-11 RX ADMIN — ACETAMINOPHEN 650 MG: 325 TABLET ORAL at 07:06

## 2023-06-11 RX ADMIN — IPRATROPIUM BROMIDE AND ALBUTEROL SULFATE 3 ML: .5; 3 SOLUTION RESPIRATORY (INHALATION) at 08:06

## 2023-06-11 RX ADMIN — IPRATROPIUM BROMIDE AND ALBUTEROL SULFATE 3 ML: .5; 3 SOLUTION RESPIRATORY (INHALATION) at 02:06

## 2023-06-11 NOTE — ED PROVIDER NOTES
Encounter Date: 6/10/2023       History     Chief Complaint   Patient presents with    Shortness of Breath     Pt has been battling SOB for the past two months  Pt has seen pcp and received albuterol neb treatments and inhaler treatments. Last treatments about an hour ago   +increased WOB  +sob   +audible wheezing      HPI  Review of patient's allergies indicates:  No Known Allergies  Past Medical History:   Diagnosis Date    Asthma      Past Surgical History:   Procedure Laterality Date    FRACTURE SURGERY      HERNIA REPAIR N/A 4/8/2021    Procedure: REPAIR, HERNIA;  Surgeon: Robbie Kevin III, MD;  Location: [facility];  Service: General;  Laterality: N/A;  RN PREOP 4/5/2021----COVID NEGATIVE ON 4/5---CHECK CONSENT IN AM     Family History   Problem Relation Age of Onset    Hypertension Mother     Lupus Mother     Thyroid disease Maternal Aunt      Social History     Tobacco Use    Smoking status: Former     Types: Cigarettes    Smokeless tobacco: Never   Substance Use Topics    Alcohol use: Yes     Comment: occasionally    Drug use: No     Review of Systems    Physical Exam     Initial Vitals   BP Pulse Resp Temp SpO2   06/10/23 2013 06/10/23 2013 06/10/23 2013 06/10/23 2140 06/10/23 2013   (!) 157/102 102 (!) 24 98.2 °F (36.8 °C) (!) 88 %      MAP       --                Physical Exam    ED Course   Critical Care    Date/Time: 6/10/2023 10:04 PM  Performed by: Evens Velasco MD  Authorized by: Evens Velasco MD   Direct patient critical care time: 10 minutes  Additional history critical care time: 10 minutes  Ordering / reviewing critical care time: 10 minutes  Documentation critical care time: 10 minutes  Consulting other physicians critical care time: 10 minutes  Total critical care time (exclusive of procedural time) : 50 minutes  Critical care was necessary to treat or prevent imminent or life-threatening deterioration of the following conditions: respiratory failure.  Critical care was time spent  personally by me on the following activities: discussions with consultants, development of treatment plan with patient or surrogate, examination of patient, ordering and performing treatments and interventions, re-evaluation of patient's condition, ordering and review of laboratory studies, obtaining history from patient or surrogate, evaluation of patient's response to treatment, review of old charts, pulse oximetry and ordering and review of radiographic studies.      Labs Reviewed   ISTAT PROCEDURE - Abnormal; Notable for the following components:       Result Value    POC PH 7.316 (*)     POC PCO2 59.8 (*)     POC PO2 39 (*)     POC HCO3 30.5 (*)     POC SATURATED O2 67 (*)     POC TCO2 32 (*)     All other components within normal limits   TROPONIN ISTAT   POCT CBC   SARS-COV-2 RDRP GENE    Narrative:     This test utilizes isothermal nucleic acid amplification technology to detect the SARS-CoV-2 RdRp nucleic acid segment. The analytical sensitivity (limit of detection) is 500 copies/swab.     A POSITIVE result is indicative of the presence of SARS-CoV-2 RNA; clinical correlation with patient history and other diagnostic information is necessary to determine patient infection status.    A NEGATIVE result means that SARS-CoV-2 nucleic acids are not present above the limit of detection. A NEGATIVE result should be treated as presumptive. It does not rule out the possibility of COVID-19 and should not be the sole basis for treatment decisions. If COVID-19 is strongly suspected based on clinical and exposure history, re-testing using an alternate molecular assay should be considered.     This test is only for use under the Food and Drug Administration s Emergency Use Authorization (EUA).     Commercial kits are provided by [employer]. Performance characteristics of the EUA have been independently verified by [facility] Department of Pathology and Laboratory Medicine.    _________________________________________________________________   The authorized Fact Sheet for Healthcare Providers and the authorized Fact Sheet for Patients of the ID NOW COVID-19 are available on the FDA website:    https://www.fda.gov/media/992822/download      https://www.fda.gov/media/956794/download      POCT INFLUENZA A/B MOLECULAR   POCT CMP   POCT TROPONIN   POCT D DIMER   POCT CMP   POCT RAPID INFLUENZA A/B   POCT D DIMER          Imaging Results              X-Ray Chest AP Portable (Final result)  Result time 06/10/23 20:57:44      Final result by Edgardo Saha MD (06/10/23 20:57:44)                   Impression:      1. Interstitial findings may reflect edema noting bandlike atelectasis projects over the left lower lung zone.  No large focal consolidation.      Electronically signed by: Edgardo Saha MD  Date:    06/10/2023  Time:    20:57               Narrative:    EXAMINATION:  XR CHEST AP PORTABLE    CLINICAL HISTORY:  Shortness of breath    TECHNIQUE:  Single frontal view of the chest was performed.    COMPARISON:  01/21/2023    FINDINGS:  The cardiomediastinal silhouette is not enlarged.  There is no pleural effusion.  The trachea is midline.  The lungs are symmetrically expanded bilaterally with mildly coarse interstitial attenuation projected over the bilateral lower lung zones.  There is left basilar subsegmental atelectasis..  No large focal consolidation seen.  There is no pneumothorax.  The osseous structures are unremarkable.                                       Medications   magnesium sulfate 2g in water 50mL IVPB (premix) (0 g Intravenous Stopped 6/10/23 2135)   albuterol sulfate nebulizer solution 5 mg (5 mg Nebulization Given 6/10/23 2056)   methylPREDNISolone sodium succinate injection 125 mg (125 mg Intravenous Given 6/10/23 2054)     Medical Decision Making:   Clinical Tests:   Lab Tests: Ordered and Reviewed  Radiological Study: Ordered and Reviewed  Medical Tests:  Ordered and Reviewed  ED Management:  Patient improved after meds given in ED. Room air sats after nebs continue to drop to 88%  Evens Velasco MD, Emergency Medicine Staff 10:05 PM 6/10/2023                        Labs Reviewed      Admission on 06/10/2023   Component Date Value Ref Range Status    POC Rapid COVID 06/10/2023 Negative  Negative Final     Acceptable 06/10/2023 Yes   Final    POC PH 06/10/2023 7.316 (L)  7.35 - 7.45 Final    POC PCO2 06/10/2023 59.8 (H)  35 - 45 mmHg Final    POC PO2 06/10/2023 39 (L)  40 - 60 mmHg Final    POC HCO3 06/10/2023 30.5 (H)  24 - 28 mmol/L Final    POC BE 06/10/2023 3  -2 to 2 mmol/L Final    POC SATURATED O2 06/10/2023 67 (L)  95 - 100 % Final    POC Lactate 06/10/2023 0.50  0.5 - 2.2 mmol/L Final    POC TCO2 06/10/2023 32 (H)  24 - 29 mmol/L Final    Sample 06/10/2023 VENOUS   Final    Site 06/10/2023 Other   Final    Allens Test 06/10/2023 N/A   Final    Albumin, POC 06/10/2023 3.8  3.3 - 5.5 g/dL Final    Alkaline Phosphatase, POC 06/10/2023 89  42 - 141 U/L Final    ALT (SGPT), POC 06/10/2023 12  10 - 47 U/L Final    AST (SGOT), POC 06/10/2023 21  11 - 38 U/L Final    POC BUN 06/10/2023 17  7 - 22 mg/dL Final    Calcium, POC 06/10/2023 10.0  8.0 - 10.3 mg/dL Final    POC Chloride 06/10/2023 104  98 - 108 mmol/L Final    POC Creatinine 06/10/2023 0.9  0.6 - 1.2 mg/dL Final    POC Glucose 06/10/2023 96  73 - 118 mg/dL Final    POC Potassium 06/10/2023 4.7  3.6 - 5.1 mmol/L Final    POC Sodium 06/10/2023 142  128 - 145 mmol/L Final    Bilirubin, POC 06/10/2023 0.6  0.2 - 1.6 mg/dL Final    POC TCO2 06/10/2023 29  18 - 33 mmol/L Final    Protein, POC 06/10/2023 8.0  6.4 - 8.1 g/dL Final    Influenza B Ag 06/10/2023 negative  Positive/Negative Final    Inflenza A Ag 06/10/2023 negative  Positive/Negative Final    POC D-DI 06/10/2023 211  0.0 - 450.0 ng/mL Final    POC Cardiac Troponin I 06/10/2023 0.00  0.00 - 0.08 ng/mL Final    Sample 06/10/2023  unknown   Final    Comment: A single negative troponin is insufficient to rule out myocardial infarction.  The use of a serial sampling protocol is recommended practice. Correlate results with reference intervals established for methodology used. Point of care and core laboratory   troponin results are not interchangeable.          Imaging Reviewed    Imaging Results              X-Ray Chest AP Portable (Final result)  Result time 06/10/23 20:57:44      Final result by Edgardo Saha MD (06/10/23 20:57:44)                   Impression:      1. Interstitial findings may reflect edema noting bandlike atelectasis projects over the left lower lung zone.  No large focal consolidation.      Electronically signed by: Edgardo Saha MD  Date:    06/10/2023  Time:    20:57               Narrative:    EXAMINATION:  XR CHEST AP PORTABLE    CLINICAL HISTORY:  Shortness of breath    TECHNIQUE:  Single frontal view of the chest was performed.    COMPARISON:  01/21/2023    FINDINGS:  The cardiomediastinal silhouette is not enlarged.  There is no pleural effusion.  The trachea is midline.  The lungs are symmetrically expanded bilaterally with mildly coarse interstitial attenuation projected over the bilateral lower lung zones.  There is left basilar subsegmental atelectasis..  No large focal consolidation seen.  There is no pneumothorax.  The osseous structures are unremarkable.                                      Medications given in ED    Medications   magnesium sulfate 2g in water 50mL IVPB (premix) (0 g Intravenous Stopped 6/10/23 2135)   albuterol sulfate nebulizer solution 5 mg (5 mg Nebulization Given 6/10/23 2056)   methylPREDNISolone sodium succinate injection 125 mg (125 mg Intravenous Given 6/10/23 2054)       Note was created using voice recognition software. Note may have occasional typographical errors that may not have been identified and edited despite good tristan initial review prior to signing.      Clinical  Impression:   Final diagnoses:  [R07.9] Chest pain  [R06.02] Shortness of breath  [J96.01, J96.02] Acute respiratory failure with hypoxia and hypercapnia (Primary)        ED Disposition Condition    Admit Stable

## 2023-06-11 NOTE — ED NOTES
Pt expressing a wish to leave AMA; discussing that with his wife now; explained to pt his O2 level drops into upper 80's on room air which is dangerously low; starting another trial on room air now

## 2023-06-11 NOTE — ED NOTES
Second attempt made to call report to floor RN assuming care of pt; was informed that Elisa will be the accepting nurse; was transferred to her assigned phone number but after the phone rang several times the call was disconnected.  Will make a third and final attempt to call report in 10 minutes

## 2023-06-11 NOTE — CARE UPDATE
47-year-old male former smoker (quit 1 year ago) with history of asthma admitted on 06/10/2023 for acute respiratory failure with hypoxia due to acute asthma exacerbation.  Presented with worsening shortness of breath, increased work of breathing and audible wheezing.  CXR with Interstitial findings may reflect edema noting bandlike atelectasis projects over the left lower lung zone.  No large focal consolidation. Patient started on scheduled DuoNebs and IV steroids.  Continue to wean oxygen as tolerated.  Monitor closely    I reviewed the patient's medications, past medical/surgical history and the H&P note. I agree with the physical exam and assessment and plan.

## 2023-06-11 NOTE — ED PROVIDER NOTES
Encounter Date: 6/10/2023    SCRIBE #1 NOTE: I, Carolyn Roldan, am scribing for, and in the presence of,  Evens Velasco MD. I have scribed the following portions of the note - Other sections scribed: HPI, ROS, PE.     History     Chief Complaint   Patient presents with    Shortness of Breath     Pt has been battling SOB for the past two months  Pt has seen pcp and received albuterol neb treatments and inhaler treatments. Last treatments about an hour ago   +increased WOB  +sob   +audible wheezing      47 y.o. male, with asthma, who presents to the ED with complaints of chronic shortness of breath that had worsened over the last 2 months with it being the worst the past 3 days. Patient reports using albuterol inhaler and nebulizer solution every 1-2 hours for 1 month. Patient reports the nebulizer machine belongs to a relative, but the solution is prescribed to him. Patient states he get temporary relief from the nebulizer treatments. Patient has not been seen by a physician since symptoms began. Patient is not on home O2. Patient also complains of chronic rhinorrhea and congestion. Patient denies cough, fever, chest pain, dyspnea on exertion, or leg swelling. Patient is a former smoker who quit 1 year ago. Patient denies illicit drug usage.     The history is provided by the patient. No  was used.   Review of patient's allergies indicates:  No Known Allergies  Past Medical History:   Diagnosis Date    Asthma      Past Surgical History:   Procedure Laterality Date    FRACTURE SURGERY      HERNIA REPAIR N/A 4/8/2021    Procedure: REPAIR, HERNIA;  Surgeon: Robbie Kevin III, MD;  Location: [facility];  Service: General;  Laterality: N/A;  RN PREOP 4/5/2021----COVID NEGATIVE ON 4/5---CHECK CONSENT IN AM     Family History   Problem Relation Age of Onset    Hypertension Mother     Lupus Mother     Thyroid disease Maternal Aunt      Social History     Tobacco Use    Smoking status: Former      Types: Cigarettes    Smokeless tobacco: Never   Substance Use Topics    Alcohol use: Yes     Comment: occasionally    Drug use: No     Review of Systems   Constitutional:  Negative for fever.   HENT:  Positive for congestion and rhinorrhea.    Respiratory:  Positive for shortness of breath. Negative for cough.    Cardiovascular:  Negative for chest pain and leg swelling.   All other systems reviewed and are negative.    Physical Exam     Initial Vitals   BP Pulse Resp Temp SpO2   06/10/23 2013 06/10/23 2013 06/10/23 2013 06/10/23 2140 06/10/23 2013   (!) 157/102 102 (!) 24 98.2 °F (36.8 °C) (!) 88 %      MAP       --                Physical Exam    Nursing note and vitals reviewed.  Constitutional: He appears well-developed and well-nourished.   SpO2 88% on room air upon arrival   HENT:   Head: Normocephalic and atraumatic.   Eyes: Pupils are equal, round, and reactive to light.   Neck: Neck supple.   Normal range of motion.  Cardiovascular:  Normal rate, regular rhythm, normal heart sounds and intact distal pulses.           Pulmonary/Chest: Accessory muscle usage present. Tachypnea noted. He is in respiratory distress (mild). He has wheezes (inspiratory and expiratory).   Musculoskeletal:         General: No tenderness. Normal range of motion.      Cervical back: Normal range of motion and neck supple.      Right lower leg: No edema.      Left lower leg: No edema.     Neurological: He is alert and oriented to person, place, and time.   Skin: Skin is warm and dry. No rash noted.   Psychiatric: He has a normal mood and affect.       ED Course   Critical Care    Date/Time: 6/10/2023 11:01 PM  Performed by: Evens Velasco MD  Authorized by: Evens Velasco MD   Direct patient critical care time: 10 minutes  Additional history critical care time: 10 minutes  Ordering / reviewing critical care time: 10 minutes  Documentation critical care time: 10 minutes  Consulting other physicians critical care time: 10 minutes  Total  critical care time (exclusive of procedural time) : 50 minutes  Critical care was necessary to treat or prevent imminent or life-threatening deterioration of the following conditions: respiratory failure.  Critical care was time spent personally by me on the following activities: discussions with consultants, development of treatment plan with patient or surrogate, examination of patient, ordering and performing treatments and interventions, re-evaluation of patient's condition, ordering and review of laboratory studies, obtaining history from patient or surrogate, evaluation of patient's response to treatment, review of old charts, pulse oximetry and ordering and review of radiographic studies.      Labs Reviewed   ISTAT PROCEDURE - Abnormal; Notable for the following components:       Result Value    POC PH 7.316 (*)     POC PCO2 59.8 (*)     POC PO2 39 (*)     POC HCO3 30.5 (*)     POC SATURATED O2 67 (*)     POC TCO2 32 (*)     All other components within normal limits   TROPONIN ISTAT   POCT CBC   SARS-COV-2 RDRP GENE    Narrative:     This test utilizes isothermal nucleic acid amplification technology to detect the SARS-CoV-2 RdRp nucleic acid segment. The analytical sensitivity (limit of detection) is 500 copies/swab.     A POSITIVE result is indicative of the presence of SARS-CoV-2 RNA; clinical correlation with patient history and other diagnostic information is necessary to determine patient infection status.    A NEGATIVE result means that SARS-CoV-2 nucleic acids are not present above the limit of detection. A NEGATIVE result should be treated as presumptive. It does not rule out the possibility of COVID-19 and should not be the sole basis for treatment decisions. If COVID-19 is strongly suspected based on clinical and exposure history, re-testing using an alternate molecular assay should be considered.     This test is only for use under the Food and Drug Administration s Emergency Use Authorization  (EUA).     Commercial kits are provided by [employer]. Performance characteristics of the EUA have been independently verified by [facility] Department of Pathology and Laboratory Medicine.   _________________________________________________________________   The authorized Fact Sheet for Healthcare Providers and the authorized Fact Sheet for Patients of the ID NOW COVID-19 are available on the FDA website:    https://www.fda.gov/media/604178/download      https://www.fda.gov/media/142663/download      POCT CMP   POCT TROPONIN   POCT D DIMER   POCT CMP   POCT RAPID INFLUENZA A/B   POCT D DIMER     EKG Readings: (Independently Interpreted)   Initial Reading: No STEMI. Rhythm: Normal Sinus Rhythm. Heart Rate: 96. Ectopy: No Ectopy. Conduction: Normal. ST Segments: Normal ST Segments. T Waves: Normal. Axis: Normal. Clinical Impression: Normal Sinus Rhythm   ECG Results              EKG 12-lead (Final result)  Result time 06/12/23 16:32:14      Final result by Interface, Lab In [facility] (06/12/23 16:32:14)                   Narrative:    Test Reason : R07.9,    Vent. Rate : 096 BPM     Atrial Rate : 096 BPM     P-R Int : 154 ms          QRS Dur : 078 ms      QT Int : 342 ms       P-R-T Axes : 076 042 066 degrees     QTc Int : 432 ms    Normal sinus rhythm  Possible Left atrial enlargement  Borderline Abnormal ECG  No previous ECGs available  Confirmed by Surya Kelley MD ([id number]) on 6/12/2023 4:32:03 PM    Referred By: [email or web address]   SELF           Confirmed By:Surya Kelley MD                                  Imaging Results              X-Ray Chest AP Portable (Final result)  Result time 06/10/23 20:57:44      Final result by Edgardo Saha MD (06/10/23 20:57:44)                   Impression:      1. Interstitial findings may reflect edema noting bandlike atelectasis projects over the left lower lung zone.  No large focal consolidation.      Electronically signed by: Edgardo Saha  MD  Date:    06/10/2023  Time:    20:57               Narrative:    EXAMINATION:  XR CHEST AP PORTABLE    CLINICAL HISTORY:  Shortness of breath    TECHNIQUE:  Single frontal view of the chest was performed.    COMPARISON:  01/21/2023    FINDINGS:  The cardiomediastinal silhouette is not enlarged.  There is no pleural effusion.  The trachea is midline.  The lungs are symmetrically expanded bilaterally with mildly coarse interstitial attenuation projected over the bilateral lower lung zones.  There is left basilar subsegmental atelectasis..  No large focal consolidation seen.  There is no pneumothorax.  The osseous structures are unremarkable.                                       Medications   magnesium sulfate 2g in water 50mL IVPB (premix) (0 g Intravenous Stopped 6/10/23 2135)   albuterol sulfate nebulizer solution 5 mg (5 mg Nebulization Given 6/10/23 2056)   methylPREDNISolone sodium succinate injection 125 mg (125 mg Intravenous Given 6/10/23 2054)     Medical Decision Making:   History:   Old Medical Records: I decided to obtain old medical records.  Initial Assessment:   47 y.o. male, with asthma, who presents to the ED with complaints of chronic shortness of breath that had worsened over the last 2 months with it being the worst the past 3 days.  Differential Diagnosis:   COPD exacerbation, heart failure, ACS, pneumonia, hypoxemia, symptomatic anemia, dysrhythmia, metabolic disturbance, others    Independently Interpreted Test(s):   I have ordered and independently interpreted EKG Reading(s) - see prior notes  Clinical Tests:   Lab Tests: Ordered and Reviewed  Radiological Study: Ordered and Reviewed  Medical Tests: Ordered and Reviewed  ED Management:  Labs unremarkable except respiratory acidosis with hypercapnia on vbg. Imaging without acute abnormality.  Room air sat's 88% on arrival. Patient without respiratory distress on 2L NC throughout ED course with sats 96%. After ED treatment, room air stats  continue to decline as low as 88%.  Patient admitted to  service. Accepted by Dr Cruz for Dr Nguyen.         Brandieibe Attestation:   Scribe #1: I performed the above scribed service and the documentation accurately describes the services I performed. I attest to the accuracy of the note.                 Labs Reviewed        Admission on 06/10/2023, Discharged on 06/12/2023   Component Date Value Ref Range Status    POC Rapid COVID 06/10/2023 Negative  Negative Final     Acceptable 06/10/2023 Yes   Final    POC PH 06/10/2023 7.316 (L)  7.35 - 7.45 Final    POC PCO2 06/10/2023 59.8 (H)  35 - 45 mmHg Final    POC PO2 06/10/2023 39 (L)  40 - 60 mmHg Final    POC HCO3 06/10/2023 30.5 (H)  24 - 28 mmol/L Final    POC BE 06/10/2023 3  -2 to 2 mmol/L Final    POC SATURATED O2 06/10/2023 67 (L)  95 - 100 % Final    POC Lactate 06/10/2023 0.50  0.5 - 2.2 mmol/L Final    POC TCO2 06/10/2023 32 (H)  24 - 29 mmol/L Final    Sample 06/10/2023 VENOUS   Final    Site 06/10/2023 Other   Final    Allens Test 06/10/2023 N/A   Final    Albumin, POC 06/10/2023 3.8  3.3 - 5.5 g/dL Final    Alkaline Phosphatase, POC 06/10/2023 89  42 - 141 U/L Final    ALT (SGPT), POC 06/10/2023 12  10 - 47 U/L Final    AST (SGOT), POC 06/10/2023 21  11 - 38 U/L Final    POC BUN 06/10/2023 17  7 - 22 mg/dL Final    Calcium, POC 06/10/2023 10.0  8.0 - 10.3 mg/dL Final    POC Chloride 06/10/2023 104  98 - 108 mmol/L Final    POC Creatinine 06/10/2023 0.9  0.6 - 1.2 mg/dL Final    POC Glucose 06/10/2023 96  73 - 118 mg/dL Final    POC Potassium 06/10/2023 4.7  3.6 - 5.1 mmol/L Final    POC Sodium 06/10/2023 142  128 - 145 mmol/L Final    Bilirubin, POC 06/10/2023 0.6  0.2 - 1.6 mg/dL Final    POC TCO2 06/10/2023 29  18 - 33 mmol/L Final    Protein, POC 06/10/2023 8.0  6.4 - 8.1 g/dL Final    Influenza B Ag 06/10/2023 negative  Positive/Negative Final    Inflenza A Ag 06/10/2023 negative  Positive/Negative Final    POC D-DI 06/10/2023 211   0.0 - 450.0 ng/mL Final    POC Cardiac Troponin I 06/10/2023 0.00  0.00 - 0.08 ng/mL Final    Sample 06/10/2023 unknown   Final    Comment: A single negative troponin is insufficient to rule out myocardial infarction.  The use of a serial sampling protocol is recommended practice. Correlate results with reference intervals established for methodology used. Point of care and core laboratory   troponin results are not interchangeable.      WBC 06/11/2023 6.49  3.90 - 12.70 K/uL Final    RBC 06/11/2023 4.68  4.60 - 6.20 M/uL Final    Hemoglobin 06/11/2023 13.7 (L)  14.0 - 18.0 g/dL Final    Hematocrit 06/11/2023 44.4  40.0 - 54.0 % Final    MCV 06/11/2023 95  82 - 98 fL Final    MCH 06/11/2023 29.3  27.0 - 31.0 pg Final    MCHC 06/11/2023 30.9 (L)  32.0 - 36.0 g/dL Final    RDW 06/11/2023 13.7  11.5 - 14.5 % Final    Platelets 06/11/2023 259  150 - 450 K/uL Final    MPV 06/11/2023 10.4  9.2 - 12.9 fL Final    Immature Granulocytes 06/11/2023 0.3  0.0 - 0.5 % Final    Gran # (ANC) 06/11/2023 5.3  1.8 - 7.7 K/uL Final    Immature Grans (Abs) 06/11/2023 0.02  0.00 - 0.04 K/uL Final    Comment: Mild elevation in immature granulocytes is non specific and   can be seen in a variety of conditions including stress response,   acute inflammation, trauma and pregnancy. Correlation with other   laboratory and clinical findings is essential.      Lymph # 06/11/2023 1.0  1.0 - 4.8 K/uL Final    Mono # 06/11/2023 0.1 (L)  0.3 - 1.0 K/uL Final    Eos # 06/11/2023 0.0  0.0 - 0.5 K/uL Final    Baso # 06/11/2023 0.01  0.00 - 0.20 K/uL Final    nRBC 06/11/2023 0  0 /100 WBC Final    Gran % 06/11/2023 81.7 (H)  38.0 - 73.0 % Final    Lymph % 06/11/2023 15.9 (L)  18.0 - 48.0 % Final    Mono % 06/11/2023 1.7 (L)  4.0 - 15.0 % Final    Eosinophil % 06/11/2023 0.2  0.0 - 8.0 % Final    Basophil % 06/11/2023 0.2  0.0 - 1.9 % Final    Differential Method 06/11/2023 Automated   Final    Sodium 06/11/2023 137  136 - 145 mmol/L Final    Potassium  06/11/2023 4.8  3.5 - 5.1 mmol/L Final    Chloride 06/11/2023 101  95 - 110 mmol/L Final    CO2 06/11/2023 26  23 - 29 mmol/L Final    Glucose 06/11/2023 126 (H)  70 - 110 mg/dL Final    BUN 06/11/2023 14  6 - 20 mg/dL Final    Creatinine 06/11/2023 0.8  0.5 - 1.4 mg/dL Final    Calcium 06/11/2023 9.6  8.7 - 10.5 mg/dL Final    Total Protein 06/11/2023 7.9  6.0 - 8.4 g/dL Final    Albumin 06/11/2023 4.0  3.5 - 5.2 g/dL Final    Total Bilirubin 06/11/2023 0.4  0.1 - 1.0 mg/dL Final    Comment: For infants and newborns, interpretation of results should be based  on gestational age, weight and in agreement with clinical  observations.    Premature Infant recommended reference ranges:  Up to 24 hours.............<8.0 mg/dL  Up to 48 hours............<12.0 mg/dL  3-5 days..................<15.0 mg/dL  6-29 days.................<15.0 mg/dL      Alkaline Phosphatase 06/11/2023 90  55 - 135 U/L Final    AST 06/11/2023 12  10 - 40 U/L Final    ALT 06/11/2023 12  10 - 44 U/L Final    Anion Gap 06/11/2023 10  8 - 16 mmol/L Final    eGFR 06/11/2023 >60  >60 mL/min/1.73 m^2 Final    Magnesium 06/11/2023 2.2  1.6 - 2.6 mg/dL Final    Phosphorus 06/11/2023 3.9  2.7 - 4.5 mg/dL Final        Imaging Reviewed    Imaging Results              X-Ray Chest AP Portable (Final result)  Result time 06/10/23 20:57:44      Final result by Edgardo Saha MD (06/10/23 20:57:44)                   Impression:      1. Interstitial findings may reflect edema noting bandlike atelectasis projects over the left lower lung zone.  No large focal consolidation.      Electronically signed by: Edgardo Saha MD  Date:    06/10/2023  Time:    20:57               Narrative:    EXAMINATION:  XR CHEST AP PORTABLE    CLINICAL HISTORY:  Shortness of breath    TECHNIQUE:  Single frontal view of the chest was performed.    COMPARISON:  01/21/2023    FINDINGS:  The cardiomediastinal silhouette is not enlarged.  There is no pleural effusion.  The trachea is  midline.  The lungs are symmetrically expanded bilaterally with mildly coarse interstitial attenuation projected over the bilateral lower lung zones.  There is left basilar subsegmental atelectasis..  No large focal consolidation seen.  There is no pneumothorax.  The osseous structures are unremarkable.                                      Medications given in ED    Medications   magnesium sulfate 2g in water 50mL IVPB (premix) (0 g Intravenous Stopped 6/10/23 2135)   albuterol sulfate nebulizer solution 5 mg (5 mg Nebulization Given 6/10/23 2056)   methylPREDNISolone sodium succinate injection 125 mg (125 mg Intravenous Given 6/10/23 2054)         Note was created using voice recognition software. Note may have occasional typographical errors that may not have been identified and edited despite good tristan initial review prior to signing.    I, Evens Velasco MD, personally performed the services described in this documentation. All medical record entries made by the scribe were at my direction and in my presence.  I have reviewed the chart and agree that the record reflects my personal performance and is accurate and complete.      Clinical Impression:   Final diagnoses:  [R07.9] Chest pain  [R06.02] Shortness of breath  [J96.01, J96.02] Acute respiratory failure with hypoxia and hypercapnia (Primary)  [J45.51] Severe persistent asthma with acute exacerbation        ED Disposition Condition    Admit Stable                Evens Velasco MD  06/17/23 0114

## 2023-06-11 NOTE — PLAN OF CARE
Case Management Assessment     PCP: Pt would like to switch to [facility]  [facility]    Patient Arrived From: home  Existing Help at Home: Mauroanda- spouse    Barriers to Discharge: none    Discharge Plan:    A. Home with family   B. Home with family         Pt requesting a nebulizer upon discharge.         06/11/23 1509   Discharge Assessment   Assessment Type Discharge Planning Assessment   Confirmed/corrected address, phone number and insurance Yes   Confirmed Demographics Correct on Facesheet   Source of Information patient   When was your last doctors appointment? 03/22/23   Reason For Admission Respiratory failure with hypoxia due to acute exacerbation   People in Home spouse   Facility Arrived From: home   Do you expect to return to your current living situation? Yes   Do you have help at home or someone to help you manage your care at home? Yes   Who are your caregiver(s) and their phone number(s)? Sona Box (Spouse)   [phone]   Prior to hospitilization cognitive status: Alert/Oriented   Current cognitive status: Alert/Oriented   Walking or Climbing Stairs   (none)   Dressing/Bathing   (none)   Equipment Currently Used at Home none   Readmission within 30 days? No   Patient currently being followed by outpatient case management? No   Do you currently have service(s) that help you manage your care at home? No   Do you take prescription medications? Yes   Do you have prescription coverage? Yes   Coverage [employer]- [employer]   Do you have any problems affording any of your prescribed medications? No   Is the patient taking medications as prescribed? yes   Who is going to help you get home at discharge? Ayanda- spouse   How do you get to doctors appointments? car, drives self   Are you on dialysis? No   Do you take coumadin? No   Discharge Plan A Home with family   Discharge Plan B Home with family   DME Needed Upon Discharge  nebulizer    Discharge Plan discussed with: Patient;Spouse/sig other   Name(s) and Number(s) Sona Box (Spouse)   [phone]   Transition of Care Barriers None   Physical Activity   On average, how many days per week do you engage in moderate to strenuous exercise (like a brisk walk)? 0 days   On average, how many minutes do you engage in exercise at this level? 0 min   Financial Resource Strain   How hard is it for you to pay for the very basics like food, housing, medical care, and heating? Not very   Housing Stability   In the last 12 months, was there a time when you were not able to pay the mortgage or rent on time? N   In the last 12 months, how many places have you lived? 1   In the last 12 months, was there a time when you did not have a steady place to sleep or slept in a shelter (including now)? N   Transportation Needs   In the past 12 months, has lack of transportation kept you from medical appointments or from getting medications? no   In the past 12 months, has lack of transportation kept you from meetings, work, or from getting things needed for daily living? No   Food Insecurity   Within the past 12 months, you worried that your food would run out before you got the money to buy more. Never true   Within the past 12 months, the food you bought just didn't last and you didn't have money to get more. Never true   Stress   Do you feel stress - tense, restless, nervous, or anxious, or unable to sleep at night because your mind is troubled all the time - these days? Only a littl   Social Connections   In a typical week, how many times do you talk on the phone with family, friends, or neighbors? More than 3   How often do you get together with friends or relatives? More than 3   How often do you attend Lutheran or Mandaeism services? Never   Do you belong to any clubs or organizations such as Lutheran groups, unions, fraternal or athletic groups, or school groups? No   How often do you attend meetings of the clubs  or organizations you belong to? Never   Are you , , , , never , or living with a partner?    Alcohol Use   Q1: How often do you have a drink containing alcohol? Patient refu   Q2: How many drinks containing alcohol do you have on a typical day when you are drinking? Patient refu   Q3: How often do you have six or more drinks on one occasion? Patient refu   OTHER   Name(s) of People in Home Ayanda- spouse

## 2023-06-11 NOTE — ASSESSMENT & PLAN NOTE
Severe persistent asthma with acute exacerbation.  Continue DuoNebs every 4 hours.  Continue IV dexamethasone 10 mg Q6 hours.  GI PPx-Protonix.  Recommend discharging with nebulizer as well as PRN Albuterol and scheduled Symbicort upon discharge.  Recommend close outpatient pulmonary follow-up after discharge.

## 2023-06-11 NOTE — ED NOTES
Attempted to call report to floor RN assuming care of pt; was told that an RN had not been assigned to pt yet and was asked to call back in 10-15 minutes

## 2023-06-11 NOTE — H&P
[facility] Medicine  History & Physical    Patient Name: Andrea Shields  MRN: [id number]  Patient Class: IP- Inpatient  Admission Date: 6/10/2023  Attending Physician: Jesús Nguyen MD   Primary Care Provider: Primary Doctor No         Patient information was obtained from patient, spouse/SO and ER records.     Subjective:     Principal Problem:<principal problem not specified>    Chief Complaint:   Chief Complaint   Patient presents with    Shortness of Breath     Pt has been battling SOB for the past two months  Pt has seen pcp and received albuterol neb treatments and inhaler treatments. Last treatments about an hour ago   +increased WOB  +sob   +audible wheezing         HPI: Mr Shields Is a pleasant 47-year-old -American male who is being transferred to our facility due to asthma exacerbation.  He reports longstanding history of asthma as a child, that later had slowly subsided but has returned back lately.  He reports having stopped smoking about 1 year ago.  Denies any chest surgeries.  Denies any known triggers.  States that he has as needed albuterol inhaler or, and he was given albuterol nebulizer solution but not the machine itself and therefore he has been using his daughter's machine to get his breathing treatments and over the last few days he has been using his albuterol inhaler every couple of hours without any significant relief that prompted him to go to the outside ER.  Over there, his chest x-ray demonstrated some interstitial findings which appear to be nonspecific without any large consolidation, his VBG showed pH of 7.316, PCO2 of 59.8, PO2 of 39.  His CBC showed normal white count of 7000, hemoglobin of 13.1 and platelets of 2 65,000.  He was noted to be intermittently hypoxic despite receiving breathing treatments and was sent to the hospital for further evaluation and treatment.      Past Medical History:   Diagnosis Date    Asthma        Past Surgical History:    Procedure Laterality Date    FRACTURE SURGERY      HERNIA REPAIR N/A 4/8/2021    Procedure: REPAIR, HERNIA;  Surgeon: Robbie Kevin III, MD;  Location: [facility];  Service: General;  Laterality: N/A;  RN PREOP 4/5/2021----COVID NEGATIVE ON 4/5---CHECK CONSENT IN AM       Review of patient's allergies indicates:  No Known Allergies    Current Facility-Administered Medications on File Prior to Encounter   Medication    clindamycin in D5W 600 mg/50 mL IVPB 600 mg     Current Outpatient Medications on File Prior to Encounter   Medication Sig    albuterol (PROVENTIL/VENTOLIN HFA) 90 mcg/actuation inhaler Inhale 1-2 puffs into the lungs every 6 (six) hours as needed for Wheezing. Rescue    albuterol-ipratropium (DUO-NEB) 2.5 mg-0.5 mg/3 mL nebulizer solution Take 3 mLs by nebulization every 6 (six) hours as needed for Wheezing. Rescue    HYDROcodone-acetaminophen (NORCO)  mg per tablet TAKE 1/2 TO 1 TABLET BY MOUTH EVERY 6 HOURS AS NEEDED FOR PAIN (Patient not taking: Reported on 4/7/2022)    mupirocin (BACTROBAN) 2 % ointment Apply to affected area 3 times daily (Patient not taking: Reported on 1/21/2023)    ondansetron (ZOFRAN) 4 MG tablet Take 1 tablet (4 mg total) by mouth every 6 (six) hours. (Patient not taking: Reported on 4/7/2022)    pantoprazole (PROTONIX) 20 MG tablet Take 1 tablet (20 mg total) by mouth once daily. (Patient not taking: Reported on 4/7/2022)     Family History       Problem Relation (Age of Onset)    Hypertension Mother    Lupus Mother    Thyroid disease Maternal Aunt          Tobacco Use    Smoking status: Former     Types: Cigarettes    Smokeless tobacco: Never   Substance and Sexual Activity    Alcohol use: Yes     Comment: occasionally    Drug use: No    Sexual activity: Not on file     Review of Systems  12 point systems reviewed and negative except as mentioned in HPI section.    Objective:     Vital Signs (Most Recent):  Temp: 97.8 °F (36.6 °C) (06/11/23  0027)  Pulse: 73 (06/11/23 0205)  Resp: 18 (06/11/23 0205)  BP: (!) 139/94 (06/11/23 0027)  SpO2: 100 % (06/11/23 0205) Vital Signs (24h Range):  Temp:  [97.8 °F (36.6 °C)-98.2 °F (36.8 °C)] 97.8 °F (36.6 °C)  Pulse:  [] 73  Resp:  [13-24] 18  SpO2:  [88 %-100 %] 100 %  BP: (115-157)/() 139/94     Weight: 99.6 kg (219 lb 9.3 oz)  Body mass index is 33.39 kg/m².     Physical Exam   General: Alert , mild to moderate respiratory distress  Eye: PERRL, normal conjunctiva  HEENT: Normocephalic, atraumatic, dry  oral mucosa  Neck: Supple  Respiratory: Diffusely wheezing bilaterally, equal breath sounds, symmetric expansion, no chest wall tenderness  Cardiovascular: Normal rate, regular rhythm, no appreciable murmurs rubs or gallops, no peripheral edema, good pulses and equal in all extremities.  Gastrointestinal: Soft, non-tender, non-distended, normal bowel sounds, no appreciable hepatosplenomegaly  Genitourinary: Deferred  Musculoskeletal and Neurologic: Motor function is normal with muscle strength 5/5 bilaterally to upper and lower extremities. Sensation is intact bilaterally. Reflexes 2+ bilaterally  Psychiatric: Appropriate mood and affect.          Significant Labs: All pertinent labs within the past 24 hours have been reviewed.    Significant Imaging: I have reviewed all pertinent imaging results/findings within the past 24 hours.  I have reviewed and interpreted all pertinent imaging results/findings within the past 24 hours.    Assessment/Plan:     Severe persistent asthma  Severe persistent asthma with acute exacerbation.  Continue DuoNebs every 4 hours.  Continue IV dexamethasone 10 mg Q6 hours.  GI PPx-Protonix.  Recommend discharging with nebulizer as well as PRN Albuterol and scheduled Symbicort upon discharge.  Recommend close outpatient pulmonary follow-up after discharge.      VTE Risk Mitigation (From admission, onward)         Ordered     enoxaparin injection 40 mg  Daily         06/11/23  0247     IP VTE HIGH RISK PATIENT  Once         06/11/23 0247     Place sequential compression device  Until discontinued         06/11/23 0247                           Saturnino Cruz MD  Department of Hospital Medicine  [facility]

## 2023-06-11 NOTE — SUBJECTIVE & OBJECTIVE
Past Medical History:   Diagnosis Date    Asthma        Past Surgical History:   Procedure Laterality Date    FRACTURE SURGERY      HERNIA REPAIR N/A 4/8/2021    Procedure: REPAIR, HERNIA;  Surgeon: Robbie Kevin III, MD;  Location: [facility];  Service: General;  Laterality: N/A;  RN PREOP 4/5/2021----COVID NEGATIVE ON 4/5---CHECK CONSENT IN AM       Review of patient's allergies indicates:  No Known Allergies    Current Facility-Administered Medications on File Prior to Encounter   Medication    clindamycin in D5W 600 mg/50 mL IVPB 600 mg     Current Outpatient Medications on File Prior to Encounter   Medication Sig    albuterol (PROVENTIL/VENTOLIN HFA) 90 mcg/actuation inhaler Inhale 1-2 puffs into the lungs every 6 (six) hours as needed for Wheezing. Rescue    albuterol-ipratropium (DUO-NEB) 2.5 mg-0.5 mg/3 mL nebulizer solution Take 3 mLs by nebulization every 6 (six) hours as needed for Wheezing. Rescue    HYDROcodone-acetaminophen (NORCO)  mg per tablet TAKE 1/2 TO 1 TABLET BY MOUTH EVERY 6 HOURS AS NEEDED FOR PAIN (Patient not taking: Reported on 4/7/2022)    mupirocin (BACTROBAN) 2 % ointment Apply to affected area 3 times daily (Patient not taking: Reported on 1/21/2023)    ondansetron (ZOFRAN) 4 MG tablet Take 1 tablet (4 mg total) by mouth every 6 (six) hours. (Patient not taking: Reported on 4/7/2022)    pantoprazole (PROTONIX) 20 MG tablet Take 1 tablet (20 mg total) by mouth once daily. (Patient not taking: Reported on 4/7/2022)     Family History       Problem Relation (Age of Onset)    Hypertension Mother    Lupus Mother    Thyroid disease Maternal Aunt          Tobacco Use    Smoking status: Former     Types: Cigarettes    Smokeless tobacco: Never   Substance and Sexual Activity    Alcohol use: Yes     Comment: occasionally    Drug use: No    Sexual activity: Not on file     Review of Systems  12 point systems reviewed and negative except as mentioned in HPI section.    Objective:      Vital Signs (Most Recent):  Temp: 97.8 °F (36.6 °C) (06/11/23 0027)  Pulse: 73 (06/11/23 0205)  Resp: 18 (06/11/23 0205)  BP: (!) 139/94 (06/11/23 0027)  SpO2: 100 % (06/11/23 0205) Vital Signs (24h Range):  Temp:  [97.8 °F (36.6 °C)-98.2 °F (36.8 °C)] 97.8 °F (36.6 °C)  Pulse:  [] 73  Resp:  [13-24] 18  SpO2:  [88 %-100 %] 100 %  BP: (115-157)/() 139/94     Weight: 99.6 kg (219 lb 9.3 oz)  Body mass index is 33.39 kg/m².     Physical Exam   General: Alert , mild to moderate respiratory distress  Eye: PERRL, normal conjunctiva  HEENT: Normocephalic, atraumatic, dry  oral mucosa  Neck: Supple  Respiratory: Diffusely wheezing bilaterally, equal breath sounds, symmetric expansion, no chest wall tenderness  Cardiovascular: Normal rate, regular rhythm, no appreciable murmurs rubs or gallops, no peripheral edema, good pulses and equal in all extremities.  Gastrointestinal: Soft, non-tender, non-distended, normal bowel sounds, no appreciable hepatosplenomegaly  Genitourinary: Deferred  Musculoskeletal and Neurologic: Motor function is normal with muscle strength 5/5 bilaterally to upper and lower extremities. Sensation is intact bilaterally. Reflexes 2+ bilaterally  Psychiatric: Appropriate mood and affect.          Significant Labs: All pertinent labs within the past 24 hours have been reviewed.    Significant Imaging: I have reviewed all pertinent imaging results/findings within the past 24 hours.  I have reviewed and interpreted all pertinent imaging results/findings within the past 24 hours.

## 2023-06-11 NOTE — HPI
Mr Shields Is a pleasant 47-year-old -American male who is being transferred to our facility due to asthma exacerbation.  He reports longstanding history of asthma as a child, that later had slowly subsided but has returned back lately.  He reports having stopped smoking about 1 year ago.  Denies any chest surgeries.  Denies any known triggers.  States that he has as needed albuterol inhaler or, and he was given albuterol nebulizer solution but not the machine itself and therefore he has been using his daughter's machine to get his breathing treatments and over the last few days he has been using his albuterol inhaler every couple of hours without any significant relief that prompted him to go to the outside ER.  Over there, his chest x-ray demonstrated some interstitial findings which appear to be nonspecific without any large consolidation, his VBG showed pH of 7.316, PCO2 of 59.8, PO2 of 39.  His CBC showed normal white count of 7000, hemoglobin of 13.1 and platelets of 2 65,000.  He was noted to be intermittently hypoxic despite receiving breathing treatments and was sent to the hospital for further evaluation and treatment.

## 2023-06-11 NOTE — ED NOTES
Third attempt made to call report to receiving facility/RN; was put placed on hold for over 5 minutes; will wait for receiving floor to call for report on pt

## 2023-06-12 VITALS
HEART RATE: 98 BPM | DIASTOLIC BLOOD PRESSURE: 85 MMHG | RESPIRATION RATE: 18 BRPM | WEIGHT: 219.56 LBS | SYSTOLIC BLOOD PRESSURE: 124 MMHG | BODY MASS INDEX: 33.39 KG/M2 | TEMPERATURE: 98 F | OXYGEN SATURATION: 95 %

## 2023-06-12 PROCEDURE — 25000003 PHARM REV CODE 250: Performed by: INTERNAL MEDICINE

## 2023-06-12 PROCEDURE — 94760 N-INVAS EAR/PLS OXIMETRY 1: CPT

## 2023-06-12 PROCEDURE — 63600175 PHARM REV CODE 636 W HCPCS: Performed by: INTERNAL MEDICINE

## 2023-06-12 PROCEDURE — 94640 AIRWAY INHALATION TREATMENT: CPT

## 2023-06-12 PROCEDURE — 25000003 PHARM REV CODE 250: Performed by: NURSE PRACTITIONER

## 2023-06-12 PROCEDURE — 25000242 PHARM REV CODE 250 ALT 637 W/ HCPCS: Performed by: INTERNAL MEDICINE

## 2023-06-12 PROCEDURE — 99900035 HC TECH TIME PER 15 MIN (STAT)

## 2023-06-12 RX ORDER — MONTELUKAST SODIUM 10 MG/1
10 TABLET ORAL DAILY
Qty: 30 TABLET | Refills: 0 | Status: SHIPPED | OUTPATIENT
Start: 2023-06-13 | End: 2023-07-13

## 2023-06-12 RX ORDER — PREDNISONE 20 MG/1
40 TABLET ORAL DAILY
Qty: 10 TABLET | Refills: 0 | Status: SHIPPED | OUTPATIENT
Start: 2023-06-13 | End: 2023-06-18

## 2023-06-12 RX ORDER — ALBUTEROL SULFATE 0.83 MG/ML
2.5 SOLUTION RESPIRATORY (INHALATION) EVERY 6 HOURS PRN
Qty: 75 ML | Refills: 0 | Status: SHIPPED | OUTPATIENT
Start: 2023-06-12 | End: 2023-07-12

## 2023-06-12 RX ADMIN — DEXAMETHASONE SODIUM PHOSPHATE 10 MG: 10 INJECTION INTRAMUSCULAR; INTRAVENOUS at 06:06

## 2023-06-12 RX ADMIN — IPRATROPIUM BROMIDE AND ALBUTEROL SULFATE 3 ML: .5; 3 SOLUTION RESPIRATORY (INHALATION) at 07:06

## 2023-06-12 RX ADMIN — IPRATROPIUM BROMIDE AND ALBUTEROL SULFATE 3 ML: .5; 3 SOLUTION RESPIRATORY (INHALATION) at 12:06

## 2023-06-12 RX ADMIN — PANTOPRAZOLE SODIUM 40 MG: 40 TABLET, DELAYED RELEASE ORAL at 09:06

## 2023-06-12 RX ADMIN — BUTALBITAL, ACETAMINOPHEN, AND CAFFEINE 1 TABLET: 50; 325; 40 TABLET ORAL at 01:06

## 2023-06-12 RX ADMIN — IPRATROPIUM BROMIDE AND ALBUTEROL SULFATE 3 ML: .5; 3 SOLUTION RESPIRATORY (INHALATION) at 04:06

## 2023-06-12 RX ADMIN — MONTELUKAST 10 MG: 10 TABLET, FILM COATED ORAL at 09:06

## 2023-06-12 RX ADMIN — DEXAMETHASONE SODIUM PHOSPHATE 10 MG: 10 INJECTION INTRAMUSCULAR; INTRAVENOUS at 01:06

## 2023-06-12 NOTE — PLAN OF CARE
PENNY notified nurse Saundra that patient is ready for discharge from case management standpoint.        06/12/23 1056   Final Note   Assessment Type Final Discharge Note   Anticipated Discharge Disposition Home   What phone number can be called within the next 1-3 days to see how you are doing after discharge? [phone]   Post-Acute Status   Post-Acute Authorization Other   Coverage Medicaid   Other Status No Post-Acute Service Needs   Discharge Delays None known at this time

## 2023-06-12 NOTE — HOSPITAL COURSE
47-year-old male former smoker (quit 1 year ago) with history of asthma admitted on 06/10/2023 for acute respiratory failure with hypoxia due to acute asthma exacerbation.  Presented with worsening shortness of breath, increased work of breathing and audible wheezing.  CXR with Interstitial findings may reflect edema noting bandlike atelectasis projects over the left lower lung zone.  No large focal consolidation. Patient started on scheduled DuoNebs and IV steroids with improvement.  Able to wean off oxygen to room air.  Completed 6 minute walk test without difficulty.  Intermittently dropped O2 sats to 88% while ambulating, but quickly recovered without any supplemental oxygen.    Patient admits feeling significantly better.  No longer having any shortness of breath, coughing, wheezing.  Feels ready to go home.  Request ambulatory referral for pulmonology. Pt denies any fever, headaches, vision changes, chest pain, shortness of breath, palpitations, abdominal pain, nausea, vomiting, or any new weaknesses. Feels ready to go home. Patient's exam on discharge was as follow: Patient is alert and oriented, appears in no acute distress, heart with regular rate and rhythm, lungs clear to asculation with no wheezing, on room air,  abdomen soft, and no new weaknesses or focal deficits seen. Bilateral lower extremities without any edema or calf tenderness.     Patient was counseled regarding any abnormal labs, differential diagnosis, treatment options, risk-benefit, lifestyle changes, prognosis, current condition, and medications. Patient was interactive and attentive.  Patient's questions were answered in a respectful and timely manner. Patient was instructed to follow-up with PCP within 1 week and to continue taking medications as prescribed.  Instructed to also follow up with pulmonology outpatient. Referral placed. Also, extensively discussed the risks, benefits, and side effects of patient's medications. Discussed with  patient about any medication changes. Patient verbalized understanding and agrees to treatment plan.  Patient is stable for discharge.  Patient has no other questions or concerns at this time.  ED precautions discussed with the patient.    Vital signs are stable. Ambulating without any difficulty. Tolerating p.o. intake without any nausea or vomiting. Afebrile for over 24 hours. Patient is in stable condition and has no questions or concerns. Patient will be discharge to home once transportation secured . Prescriptions sent to pharmacy.  CM/SW to assist with discharge planning.      Vitals:    06/12/23 0409 06/12/23 0428 06/12/23 0743 06/12/23 0755   BP:  (!) 148/97  124/85   BP Location:  Left arm  Left arm   Patient Position:  Lying  Lying   Pulse: 84 84 85 98   Resp: 18 18 18 18   Temp:  97.6 °F (36.4 °C)  97.7 °F (36.5 °C)   TempSrc:  Oral  Oral   SpO2: 96% 100% 98% 95%   Weight:

## 2023-06-12 NOTE — DISCHARGE SUMMARY
[facility] Medicine  Discharge Summary      Patient Name: Andrea Shields  MRN: [id number]  [facility]: [id number]  Patient Class: IP- Inpatient  Admission Date: 6/10/2023  Hospital Length of Stay: 2 days  Discharge Date and Time: 6/12/2023 11:21 AM  Attending Physician: Afshan att. providers found   Discharging Provider: Oscar Bunn DO  Primary Care Provider: Primary Doctor Afshan    Primary Care Team: Networked reference to record PCT     HPI:   Mr Shields Is a pleasant 47-year-old -American male who is being transferred to our facility due to asthma exacerbation.  He reports longstanding history of asthma as a child, that later had slowly subsided but has returned back lately.  He reports having stopped smoking about 1 year ago.  Denies any chest surgeries.  Denies any known triggers.  States that he has as needed albuterol inhaler or, and he was given albuterol nebulizer solution but not the machine itself and therefore he has been using his daughter's machine to get his breathing treatments and over the last few days he has been using his albuterol inhaler every couple of hours without any significant relief that prompted him to go to the outside ER.  Over there, his chest x-ray demonstrated some interstitial findings which appear to be nonspecific without any large consolidation, his VBG showed pH of 7.316, PCO2 of 59.8, PO2 of 39.  His CBC showed normal white count of 7000, hemoglobin of 13.1 and platelets of 2 65,000.  He was noted to be intermittently hypoxic despite receiving breathing treatments and was sent to the hospital for further evaluation and treatment.      * No surgery found *      Hospital Course:   47-year-old male former smoker (quit 1 year ago) with history of asthma admitted on 06/10/2023 for acute respiratory failure with hypoxia due to acute asthma exacerbation.  Presented with worsening shortness of breath, increased work of breathing and audible wheezing.  CXR with  Interstitial findings may reflect edema noting bandlike atelectasis projects over the left lower lung zone.  No large focal consolidation. Patient started on scheduled DuoNebs and IV steroids with improvement.  Able to wean off oxygen to room air.  Completed 6 minute walk test without difficulty.  Intermittently dropped O2 sats to 88% while ambulating, but quickly recovered without any supplemental oxygen.    Patient admits feeling significantly better.  No longer having any shortness of breath, coughing, wheezing.  Feels ready to go home.  Request ambulatory referral for pulmonology. Pt denies any fever, headaches, vision changes, chest pain, shortness of breath, palpitations, abdominal pain, nausea, vomiting, or any new weaknesses. Feels ready to go home. Patient's exam on discharge was as follow: Patient is alert and oriented, appears in no acute distress, heart with regular rate and rhythm, lungs clear to asculation with no wheezing, on room air,  abdomen soft, and no new weaknesses or focal deficits seen. Bilateral lower extremities without any edema or calf tenderness.     Patient was counseled regarding any abnormal labs, differential diagnosis, treatment options, risk-benefit, lifestyle changes, prognosis, current condition, and medications. Patient was interactive and attentive.  Patient's questions were answered in a respectful and timely manner. Patient was instructed to follow-up with PCP within 1 week and to continue taking medications as prescribed.  Instructed to also follow up with pulmonology outpatient. Referral placed. Also, extensively discussed the risks, benefits, and side effects of patient's medications. Discussed with patient about any medication changes. Patient verbalized understanding and agrees to treatment plan.  Patient is stable for discharge.  Patient has no other questions or concerns at this time.  ED precautions discussed with the patient.    Vital signs are stable. Ambulating  without any difficulty. Tolerating p.o. intake without any nausea or vomiting. Afebrile for over 24 hours. Patient is in stable condition and has no questions or concerns. Patient will be discharge to home once transportation secured . Prescriptions sent to pharmacy.  CM/SW to assist with discharge planning.      Vitals:    06/12/23 0409 06/12/23 0428 06/12/23 0743 06/12/23 0755   BP:  (!) 148/97  124/85   BP Location:  Left arm  Left arm   Patient Position:  Lying  Lying   Pulse: 84 84 85 98   Resp: 18 18 18 18   Temp:  97.6 °F (36.4 °C)  97.7 °F (36.5 °C)   TempSrc:  Oral  Oral   SpO2: 96% 100% 98% 95%   Weight:                Goals of Care Treatment Preferences:  Code Status: Full Code      Consults:     No new Assessment & Plan notes have been filed under this hospital service since the last note was generated.  Service: Hospital Medicine    Final Active Diagnoses:    Diagnosis Date Noted POA    PRINCIPAL PROBLEM:  Severe persistent asthma [J45.50] 06/11/2023 Yes      Problems Resolved During this Admission:       Discharged Condition: stable    Disposition: Home or Self Care    Follow Up:   Follow-up Information     [facility]. Call.    Why: To schedule a hospital follow appointment within 7 days.  Contact information:  [address] [place] [address] [phone]                     Patient Instructions:      NEBULIZER FOR HOME USE     Order Specific Question Answer Comments   Height: 5'8    Weight: 99.6 kg (219 lb 9.3 oz)    Does patient have medical equipment at home? none    Length of need (1-99 months): 12      NEBULIZER KIT (SUPPLIES) FOR HOME USE     Order Specific Question Answer Comments   Height: 5'8    Weight: 99.6 kg (219 lb 9.3 oz)    Does patient have medical equipment at home? none    Length of need (1-99 months): 12    Mask or Mouthpiece? Mask      Ambulatory referral/consult to Pulmonology   Standing Status: Future   Referral Priority: Routine Referral Type: Consultation    Referral Reason: Specialty Services Required   Requested Specialty: Pulmonary Disease   Number of Visits Requested: 1     Diet Cardiac     Notify your health care provider if you experience any of the following:  temperature >100.4     Notify your health care provider if you experience any of the following:  increased confusion or weakness     Notify your health care provider if you experience any of the following:  difficulty breathing or increased cough     Activity as tolerated       Significant Diagnostic Studies: Labs: All labs within the past 24 hours have been reviewed    Pending Diagnostic Studies:     None         Medications:  Reconciled Home Medications:      Medication List      START taking these medications    montelukast 10 mg tablet  Commonly known as: SINGULAIR  Take 1 tablet (10 mg total) by mouth once daily.  Start taking on: June 13, 2023     predniSONE 20 MG tablet  Commonly known as: DELTASONE  Take 2 tablets (40 mg total) by mouth once daily. for 5 days  Start taking on: June 13, 2023        CHANGE how you take these medications    * albuterol 90 mcg/actuation inhaler  Commonly known as: PROVENTIL/VENTOLIN HFA  Inhale 1-2 puffs into the lungs every 6 (six) hours as needed for Wheezing. Rescue  What changed: Another medication with the same name was added. Make sure you understand how and when to take each.     * albuterol 2.5 mg /3 mL (0.083 %) nebulizer solution  Commonly known as: PROVENTIL  Take 3 mLs (2.5 mg total) by nebulization every 6 (six) hours as needed for Wheezing or Shortness of Breath. Rescue  What changed: You were already taking a medication with the same name, and this prescription was added. Make sure you understand how and when to take each.         * This list has 2 medication(s) that are the same as other medications prescribed for you. Read the directions carefully, and ask your doctor or other care provider to review them with you.            CONTINUE taking these  medications    albuterol-ipratropium 2.5 mg-0.5 mg/3 mL nebulizer solution  Commonly known as: DUO-NEB  Take 3 mLs by nebulization every 6 (six) hours as needed for Wheezing. Rescue        STOP taking these medications    HYDROcodone-acetaminophen  mg per tablet  Commonly known as: NORCO     mupirocin 2 % ointment  Commonly known as: BACTROBAN     ondansetron 4 MG tablet  Commonly known as: ZOFRAN        ASK your doctor about these medications    pantoprazole 20 MG tablet  Commonly known as: PROTONIX  Take 1 tablet (20 mg total) by mouth once daily.            Indwelling Lines/Drains at time of discharge:   Lines/Drains/Airways     None                 Time spent on the discharge of patient: Greater than 35 minutes         Oscar Bunn DO  Department of Hospital Medicine  [facility] - Telemetry

## 2023-06-12 NOTE — PLAN OF CARE
Patient approved for nebulizer by Anastacio at [facility]. SW notified Respiratory. RT to deliver nebulizer.        06/12/23 2043   Post-Acute Status   Post-Acute Authorization [facility] Status Set-up Complete/Auth obtained   Coverage Medicaid   Other Status No Post-Acute Service Needs   Discharge Delays None known at this time   Discharge Plan   Discharge Plan A Home   Discharge Plan B Home

## 2023-06-14 ENCOUNTER — PATIENT OUTREACH (OUTPATIENT)
Dept: ADMINISTRATIVE | Facility: CLINIC | Age: 48
End: 2023-06-14
Payer: MEDICAID

## 2023-06-14 ENCOUNTER — PATIENT MESSAGE (OUTPATIENT)
Dept: ADMINISTRATIVE | Facility: CLINIC | Age: 48
End: 2023-06-14
Payer: MEDICAID

## 2023-06-14 NOTE — PROGRESS NOTES
C3 nurse attempted to contact Andrea Shields  for a TCC post hospital discharge follow up call. No answer. Left voicemail with callback information. The patient does not have a scheduled HOSFU appointment.  Non [facility] PCP

## 2023-06-14 NOTE — PROGRESS NOTES
C3 nurse attempted to contact Andrea Shields  for a TCC post hospital discharge follow up call. No answer. The patient does not have a scheduled HOSFU appointment, and the pt does not have an [facility] PCP.

## 2023-06-15 NOTE — PROGRESS NOTES
C3 nurse spoke with Andrea Shields for a TCC post hospital discharge follow up call. The patient has a scheduled HOSFU appointment with Non [facility] PCP on 06/15/2023 @ 2502.

## (undated) DEVICE — APPLICATOR CHLORAPREP ORN 26ML

## (undated) DEVICE — COVER OVERHEAD SURG LT BLUE

## (undated) DEVICE — SOL 9P NACL IRR PIC IL

## (undated) DEVICE — GLOVE SURGICAL LATEX SZ 7

## (undated) DEVICE — SLEEVE SCD EXPRESS CALF MEDIUM

## (undated) DEVICE — ELECTRODE REM PLYHSV RETURN 9

## (undated) DEVICE — STAPLER SKIN ROTATING HEAD

## (undated) DEVICE — PACK ENDOSCOPY GENERAL

## (undated) DEVICE — SUT ETHIBOND EXCEL 0 MO6 18

## (undated) DEVICE — SEE MEDLINE ITEM 146417

## (undated) DEVICE — Device

## (undated) DEVICE — SUT VICRYL 3-0 27 SH

## (undated) DEVICE — BLANKET UPPER BODY 78.7X29.9IN